# Patient Record
Sex: FEMALE | Race: WHITE | NOT HISPANIC OR LATINO | Employment: OTHER | URBAN - NONMETROPOLITAN AREA
[De-identification: names, ages, dates, MRNs, and addresses within clinical notes are randomized per-mention and may not be internally consistent; named-entity substitution may affect disease eponyms.]

---

## 2023-02-25 PROBLEM — M25.661: Status: ACTIVE | Noted: 2023-02-25

## 2023-02-25 PROBLEM — M62.81 MUSCLE WEAKNESS: Status: ACTIVE | Noted: 2023-02-25

## 2023-02-25 PROBLEM — Z87.42 HISTORY OF DYSPAREUNIA IN FEMALE: Status: ACTIVE | Noted: 2023-02-25

## 2023-02-25 PROBLEM — R07.9 CHEST PAIN: Status: ACTIVE | Noted: 2023-02-25

## 2023-02-25 PROBLEM — M25.561 KNEE PAIN, RIGHT: Status: ACTIVE | Noted: 2023-02-25

## 2023-02-25 PROBLEM — M62.838 MUSCLE SPASM: Status: ACTIVE | Noted: 2023-02-25

## 2023-02-25 PROBLEM — F54 PSYCHOLOGICAL FACTOR AFFECTING PHYSICAL CONDITION: Status: ACTIVE | Noted: 2023-02-25

## 2023-02-25 PROBLEM — N87.1 CIN II (CERVICAL INTRAEPITHELIAL NEOPLASIA II): Status: ACTIVE | Noted: 2023-02-25

## 2023-02-25 PROBLEM — N95.1 MENOPAUSAL SYMPTOMS: Status: ACTIVE | Noted: 2023-02-25

## 2023-02-25 RX ORDER — OMEPRAZOLE 40 MG/1
CAPSULE, DELAYED RELEASE ORAL
COMMUNITY
End: 2023-08-16 | Stop reason: WASHOUT

## 2023-02-25 RX ORDER — ESTRADIOL 4 UG/1
INSERT VAGINAL
COMMUNITY
End: 2023-12-20 | Stop reason: SDUPTHER

## 2023-02-25 RX ORDER — VALACYCLOVIR HYDROCHLORIDE 1 G/1
TABLET, FILM COATED ORAL
COMMUNITY
End: 2023-03-10 | Stop reason: ALTCHOICE

## 2023-02-25 RX ORDER — MULTIVITAMIN
TABLET ORAL
COMMUNITY

## 2023-03-10 ENCOUNTER — OFFICE VISIT (OUTPATIENT)
Dept: PRIMARY CARE | Facility: CLINIC | Age: 48
End: 2023-03-10
Payer: COMMERCIAL

## 2023-03-10 VITALS
RESPIRATION RATE: 12 BRPM | BODY MASS INDEX: 22.08 KG/M2 | OXYGEN SATURATION: 96 % | SYSTOLIC BLOOD PRESSURE: 125 MMHG | DIASTOLIC BLOOD PRESSURE: 85 MMHG | HEIGHT: 66 IN | HEART RATE: 77 BPM | WEIGHT: 137.4 LBS | TEMPERATURE: 98.1 F

## 2023-03-10 DIAGNOSIS — K59.00 CONSTIPATION, UNSPECIFIED CONSTIPATION TYPE: Primary | ICD-10-CM

## 2023-03-10 DIAGNOSIS — F54 PSYCHOLOGICAL FACTOR AFFECTING PHYSICAL CONDITION: ICD-10-CM

## 2023-03-10 DIAGNOSIS — Z00.00 HEALTHCARE MAINTENANCE: ICD-10-CM

## 2023-03-10 PROBLEM — M25.561 KNEE PAIN, RIGHT: Status: RESOLVED | Noted: 2023-02-25 | Resolved: 2023-03-10

## 2023-03-10 PROBLEM — N87.1 CIN II (CERVICAL INTRAEPITHELIAL NEOPLASIA II): Status: RESOLVED | Noted: 2023-02-25 | Resolved: 2023-03-10

## 2023-03-10 PROCEDURE — 99396 PREV VISIT EST AGE 40-64: CPT | Performed by: FAMILY MEDICINE

## 2023-03-10 PROCEDURE — 1036F TOBACCO NON-USER: CPT | Performed by: FAMILY MEDICINE

## 2023-03-10 PROCEDURE — 90471 IMMUNIZATION ADMIN: CPT | Performed by: FAMILY MEDICINE

## 2023-03-10 PROCEDURE — 90715 TDAP VACCINE 7 YRS/> IM: CPT | Performed by: FAMILY MEDICINE

## 2023-03-10 NOTE — ASSESSMENT & PLAN NOTE
Ongoing for several weeks. Suspected related to depressive and anxiety symptoms  Mix Benefiber with natural laxative such as apple juice or prune juice. Use 1 heaping tablespoon in water or juice per day.  You could also try using or adding psyllium husk fiber (such as Metamucil -- 1 heaping tablespoon in glass of water/juice)

## 2023-03-10 NOTE — ASSESSMENT & PLAN NOTE
Depressive and anxiety symptoms likely contributing to constipation.  Patient would not like to start medication at this time.  Planning on seeing a counselor through her 's employer.

## 2023-03-10 NOTE — PROGRESS NOTES
Subjective   Patient ID: Lien Lee is a 47 y.o. female who presents for annual physical/wellness visit.    She signed document informing that if problem issues also address at today's wellness visit that insurance may be appropriately billed so co-pay and deductible out of pocket expenses may occur.    Colorectal cancer screen: Cologuard- 05/13/2022   Mammogram: 08/04/2022  Pap: 09/13/2022  Last Menstrual Period: Hysto  Tdap: Due  Flu shot:12/14/2022  HIV screen:  Hepatitis C screen: 11/04/2020  Hepatitis B vaccine: Due     HPI     Has been having constipation recently,thinks do to stress. She is going every other day and when she goes at times she notices blood.    Patient c/o constipation. She reports having BM every other day. She does not have pain with BM but reports straining and discomfort. Ongoing for the past few weeks. Benefiber every morning. Eats a salad for lunch and has light dinner. Reports drinking milk frequently for calcium source. Patient also reports noticing red blood in her stool the morning after having colonic massage.    She believes constipation may be secondary to depression. 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 Patient reports having anxiety attack-like episode when she was line at the airport -- she felt a tightness in her chest and that she had to leave. She reports having bout of depression when her father passed away in the early 2000s. She was on Wellbutrin during that time. She notes that exercise helps with depression. She would not like to start medication at this time. She is considering starting with counseling.    Patient has been following with gynecology for vaginal atrophy and pain during intercourse. She was started estrogen vaginal suppositories 4 mcg since November. She also started. pelvic floor PT. She has not noticed much improvement. She states it is affecting her psychologically (associating pleasurable activity with  "pain), so she is following with psychologist.    Patient was seen by optometrist for dry eye. She was told that it was caused by chronic use of OTC eye drops. She was prescribed antimicrobial eye drop, eye salve at night, and eye drop. Started about 9 days ago and symptoms have started to improve.    Follows with dentist regularly. Endorses mild difficulty hearing others in a crowded room.    BLE swelling: It is ongoing which has not been improving or worsening. Ongoing for >6 months. She has been wearing compression stockings with little improvement. Generalized joint pain: She states she had x-rays of multiple joints which were negative. Autoimmune blood tests were negative. Patient has been following with urogynecologist for uterine prolapse. She reports ongoing constipation and urinary incontinence secondary to uterine prolapse.  She is planning to have a hysterectomy with possible bladder repair. She would strongly prefer to not have ovaries removed. Patient would be interested in having a second opinion about the need for bladder surgery. Patient takes levothyroxine daily. No side effects. Patient takes Nexium 40 mg daily. She reports it does not provide relief for postprandial abdominal bloating.     Review of Systems  constitutional: as per HPI  eyes:as per HPI  CV:as per HPI  Respiratory:as per HPI  GI:as per HPI  neuro:as per HPI  endo:as per HPI  heme/lymph:as per HPI     Objective   /85 (BP Location: Right arm, Patient Position: Sitting, BP Cuff Size: Adult)   Pulse 77   Temp 36.7 °C (98.1 °F) (Temporal)   Resp 12   Ht 1.676 m (5' 6\")   Wt 62.3 kg (137 lb 6.4 oz)   SpO2 96%   BMI 22.18 kg/m²     Physical Exam  Constitutional:       Appearance: Normal appearance. She is normal weight.   HENT:      Head: Normocephalic.      Right Ear: Tympanic membrane, ear canal and external ear normal.      Left Ear: Tympanic membrane, ear canal and external ear normal.   Eyes:      Conjunctiva/sclera: " Conjunctivae normal.      Pupils: Pupils are equal, round, and reactive to light.   Cardiovascular:      Rate and Rhythm: Normal rate and regular rhythm.   Pulmonary:      Effort: Pulmonary effort is normal.      Breath sounds: Normal breath sounds.   Abdominal:      General: Abdomen is flat. Bowel sounds are normal.      Palpations: Abdomen is soft.   Musculoskeletal:         General: Normal range of motion.   Skin:     General: Skin is warm.   Neurological:      Mental Status: She is alert and oriented to person, place, and time.   Psychiatric:         Mood and Affect: Mood normal.         Behavior: Behavior normal.         Thought Content: Thought content normal.         Judgment: Judgment normal.         Assessment/Plan   Problem List Items Addressed This Visit          Digestive    Constipation - Primary     Ongoing for several weeks. Suspected related to depressive and anxiety symptoms  Mix Benefiber with natural laxative such as apple juice or prune juice. Use 1 heaping tablespoon in water or juice per day.  You could also try using or adding psyllium husk fiber (such as Metamucil -- 1 heaping tablespoon in glass of water/juice)         Relevant Orders    TSH with reflex to Free T4 if abnormal       Other    Psychological factor affecting physical condition     Depressive and anxiety symptoms likely contributing to constipation.  Patient would not like to start medication at this time.  Planning on seeing a counselor through her 's employer.          Other Visit Diagnoses       Wellness examination        Healthcare maintenance        Relevant Orders    CBC and Auto Differential    Lipid Panel    Comprehensive Metabolic Panel              Hearing test:  Recommended to have hearing test to assess hearing loss.  Discussed that untreated hearing loss increases risk for dementia.  [facility] has an affordable audiology department to purchase hearing aids if needed.    Tips for your general  "wellness...;  Remember importance of daily aerobic exercise for 30minutes to help with both your physical and mental/emotional health.  ;  Take time twice a day to relax with focused breathing and relaxation.  A good source to learn \"mindfulness\" relaxation is a book \"Mindfulness for Beginners\" by Jose Clark.  ;    Strive for healthy eating with plenty of water, fruits, vegetables, and choosing as much plant based diet as able  If do consume non plant protein, choose fish high in omega (like salmon or cod), skinless poultry, and rarely anything from a cow  Avoid processed foods.  ;  Shop the perimeter of the grocery store  - not the inner aisles where all the boxed, bagged, and canned process non natural foods are   Avoid sugar - including fruit juices with added sugar and avoid artificial sweeteners (sucralose, aspartame).; if want to use sweetener, use stevia (natural plant based non caloric sweetener)  Recommended guided nutrition plans include Mediterranean Diet - online resources available     Get plenty of sleep nightly - 7 hours minimum;    Exercise 150min per week;    Do not use tobacco    Abstain or limit alcohol to 1-2 drinks per 24 hours    See your dentist at least yearly    Have an eye check at least every 5 years    Follow up 1 year for annual wellness checkup;      By signing my name below I, garrett Dorantes, attdanitza that this documentation has been prepared under the direction and in the presence of Dr. Caleb Hudson. 03/10/23   "

## 2023-06-07 ENCOUNTER — OFFICE VISIT (OUTPATIENT)
Dept: PRIMARY CARE | Facility: CLINIC | Age: 48
End: 2023-06-07
Payer: COMMERCIAL

## 2023-06-07 VITALS
BODY MASS INDEX: 21.45 KG/M2 | SYSTOLIC BLOOD PRESSURE: 145 MMHG | HEART RATE: 91 BPM | WEIGHT: 132.9 LBS | RESPIRATION RATE: 14 BRPM | DIASTOLIC BLOOD PRESSURE: 80 MMHG | OXYGEN SATURATION: 96 % | TEMPERATURE: 99.3 F

## 2023-06-07 DIAGNOSIS — R10.9 ABDOMINAL PAIN, UNSPECIFIED ABDOMINAL LOCATION: Primary | ICD-10-CM

## 2023-06-07 DIAGNOSIS — K59.00 CONSTIPATION, UNSPECIFIED CONSTIPATION TYPE: ICD-10-CM

## 2023-06-07 PROCEDURE — 1036F TOBACCO NON-USER: CPT | Performed by: FAMILY MEDICINE

## 2023-06-07 PROCEDURE — 99213 OFFICE O/P EST LOW 20 MIN: CPT | Performed by: FAMILY MEDICINE

## 2023-06-07 RX ORDER — VALACYCLOVIR HYDROCHLORIDE 1 G/1
TABLET, FILM COATED ORAL
COMMUNITY
Start: 2022-03-01

## 2023-06-07 ASSESSMENT — PATIENT HEALTH QUESTIONNAIRE - PHQ9
1. LITTLE INTEREST OR PLEASURE IN DOING THINGS: NOT AT ALL
SUM OF ALL RESPONSES TO PHQ9 QUESTIONS 1 AND 2: 0
2. FEELING DOWN, DEPRESSED OR HOPELESS: NOT AT ALL

## 2023-06-07 ASSESSMENT — PAIN SCALES - GENERAL: PAINLEVEL: 0-NO PAIN

## 2023-06-07 NOTE — PROGRESS NOTES
Subjective   Patient ID: Lien Lee is a 47 y.o. female who presents for ER Follow-up (ED visit on 06/03/2023 for severe lower abdominal/pelvic pain/Report in chart).      Dr. Hudson Patient    HPI     Patient here for follow-up of ER visit for abdominal/pelvic pain.  She was seen at [facility] on 6/3/2023  She had labs done, including CBC, CMP, UA, INR/PT, and lipase, patient reports these were normal.  She had CT abdomen/pelvis in ER and this was unremarkable per patient.    She reports hx of partial hysterectomy, she still has her ovaries.  She had LEEP that led to her hysterectomy due to scar tissue.  She also had surgery for the scar tissue after her hysterectomy.  She is presently on estrogen therapy, reports issues with atrophy, hot flashes, and sleep issues felt to be related to menopause.  No prior history of endometriosis to her knowledge.    Patient states symptoms started about 6 days ago with pelvic pain.  She subsequently developed some bloating.  These persisted for a few days, however, she was able to continue with her activities.  A few days later she went hiking and on the way back home she developed severe lower abdominal pain and rectal pressure that caused her to double over.  She endorses history of similar rectal pressure about 10 years ago.  Was told she develops scar tissue easily.    Patient states today is the first day she did not feel the lower abdominal pain/pressure.  She denies any fever during the course of this pain.    She reports ongoing issues with constipation.  Was previously having BM every day, now every other day.  Saw Dr. Hudson for this recently in the past  States was given recommendations but does not seem to be working for her.  No change in BM since this visit.  She has bene fiber with coffee every morning.  Smoothie every morning with spinach, bananas, berries.      Review of Systems   All other systems reviewed and are negative.      Objective   BP  145/80 (BP Location: Right arm, Patient Position: Sitting, BP Cuff Size: Adult)   Pulse 91   Temp 37.4 °C (99.3 °F)   Resp 14   Wt 60.3 kg (132 lb 14.4 oz)   SpO2 96%   BMI 21.45 kg/m²     Physical Exam  Vitals and nursing note reviewed.   Constitutional:       General: She is not in acute distress.     Appearance: Normal appearance. She is not toxic-appearing.   HENT:      Head: Normocephalic and atraumatic.   Cardiovascular:      Rate and Rhythm: Normal rate and regular rhythm.      Heart sounds: No murmur heard.     No friction rub. No gallop.   Pulmonary:      Effort: Pulmonary effort is normal.      Breath sounds: Normal breath sounds. No wheezing, rhonchi or rales.   Abdominal:      General: Bowel sounds are normal.      Palpations: Abdomen is soft. There is no mass.      Tenderness: There is abdominal tenderness (mildly, soft tissues slightly resistent to pressure LLQ vs RLQ) in the left lower quadrant. There is no guarding.   Musculoskeletal:      Right lower leg: No edema.      Left lower leg: No edema.   Neurological:      General: No focal deficit present.      Mental Status: She is alert and oriented to person, place, and time.   Psychiatric:         Mood and Affect: Mood normal.         Behavior: Behavior normal.       Assessment/Plan   Diagnoses and all orders for this visit:  Abdominal pain, unspecified abdominal location  Constipation, unspecified constipation type    ER records available were reviewed (complete records not available via EMR nor care everywhere). Only limited available at time of visit, will try to obtain full report.     Labs done and reviewed including CBC, CMP, UA, INR/PT, and lipase, these were normal.  She had CT abdomen/pelvis in ER and this was unremarkable per patient, report not available at time of visit today. Will try to obtain copy of this report.    Since abdominal pain is improved, patient with discomfort today/feeling well, and ER work-up unremarkable no further  work-up indicated at this time. Exam today was overall reassuring, no acute abdomen signs but there was some slight resistance to pressure of LLQ in comparison to the RLQ, we discussed this can be consistent with things such as scar tissues, adhesions. Patient may consider colonoscopy (screening vs diagnostic) if either the abdominal pain or ongoing constipation persists/worsens.    Patient reports constipation for months, predated above abdominal pain.  She saw Dr. Hudson in March 2023 for this, blood work ordered at that time including TSH but this has not been completed.  Recommended she add on Psyllium, working up to twice daily.    Follow-up with Dr. Hudson for routine care.  Call for sooner follow-up if needed.          Scribe Attestation  By signing my name below, IOneyda Scribe   attest that this documentation has been prepared under the direction and in the presence of Nneka Arnold DO.

## 2023-06-12 ENCOUNTER — TELEPHONE (OUTPATIENT)
Dept: PRIMARY CARE | Facility: CLINIC | Age: 48
End: 2023-06-12
Payer: COMMERCIAL

## 2023-06-12 NOTE — TELEPHONE ENCOUNTER
Called and spoke with patient. Patient was informed, understanding verbalized. She states that she wanted to let  you know shewas vomiting and dry heaving Saturday ( at least 12 times) along with the stomach pain from this weekend.  Had VV for dehydration through urgent care. Today she is having diarrhea, nausea and fatigue.

## 2023-06-12 NOTE — TELEPHONE ENCOUNTER
The patient is calling to obtain an update from Dr. Arnold about the CT that was done at [facility] about a week or so ago.  The patient states she had another bout of stomach pain this weekend. She thinks if may have been alcohol induced as she did have more to drink than she usually does.  The patient states she was told that she would look for the images and have a follow up call.

## 2023-06-13 DIAGNOSIS — R10.9 ABDOMINAL PAIN, UNSPECIFIED ABDOMINAL LOCATION: Primary | ICD-10-CM

## 2023-06-13 NOTE — TELEPHONE ENCOUNTER
The patient is calling back. Advised that the provider has not seen Letty's message from yesterday as of yet.  The patient wanted it stressed she does not think its alcohol induced at this time since its now Tuesday and she is still fatigued.  The patient would like to proceed with the GI referral and is asking if she could have an US ordered for her appendix?  Please advise

## 2023-06-19 ENCOUNTER — TELEPHONE (OUTPATIENT)
Dept: PRIMARY CARE | Facility: CLINIC | Age: 48
End: 2023-06-19
Payer: COMMERCIAL

## 2023-06-19 NOTE — TELEPHONE ENCOUNTER
"The patient called in and explained everything this morning that was sent to the provider in the email.   The patient is active in [employer] and was attempting to us [facility].  She was given the IT phone number for [employer] to reset her settings.  The patient was then transferred to [facility] to assist with GI appointment as she has not scheduled because she stated \" I started to feel better, so I didn't make an appointment. I had two episodes yesterday though, so I would like to make an appointment now I guess.\"  Again, patient was transferred to schedule with GI.  "

## 2023-06-19 NOTE — TELEPHONE ENCOUNTER
Patient sent me this message via email  (See below copy/pasted from email)    -she indicates not able to see things or message in portal - [employer] shows she has not logged on since march so likely she is using old follow my health - confirm [employer] access    - recommendation is same as Dr RAM - she should see GI doctor for further evaluation since labs and CT not explain her symptoms  Appears this was prior initiated so make sure this referral/appointment is made    Email:  Good morning, Dr. Hudson.    My apologies for the private email, but I don't appear to have access to message you via the portal.    I wanted to bring you up to speed on a health condition. On June 3rd, I visited the [facility] ER for lower abdominal pains. I had these awful cramps or muscle spasms that radiated around my lower abdomen near my pelvic area causing rectal spasms as well. It almost felt like menstrual cramps, but I had a hysterectomy.    They ran bloodwork, took urine samples and performed a CT scan finding nothing remarkable.    I visited your office on June 7th and visited Dr. Nneka Arriaga. For some reason, the notes are not in my  portal, but I was there. She didn't seem to find anything remarkable and said she'd get her hands on the scan to review.     On Saturday, June 10th, I was vomiting all day. I had a bit too much to drink with friends the night before, but it was the worst I'd ever experienced. I don't overindulge much - this was a rare occasion. I was vomiting and the heaving for approximately 12 hours. I scheduled an urgent care virtual visit and was told that if I couldn't keep anything down by Sunday of if my heart rate was high and blood pressure low, to go to the ER. Thankfully, that wasn't necessary. I was exhausted until Thursday of this past week. Fatigued w/ awful acid reflux (I filled the prescription you gave me)and just recovering from the illness.    I followed up last week to let her know about  the illness and to see if she had reviewed the scan. I was told she had and she found a small stone in my appendix; stating that these are typically asymptomatic. I was offered a referral to a GI doc and when they called, I told them I'd call back if need be, because come Friday, I felt I had a handle on things.    Then yesterday, the muscle spasms occurred again yesterday. They were just as painful and it happened twice. The second time, my  ran to the store and got me some Gas X - I took it and it helped in minutes.    I'm not sure what to do here. I discussed a colonoscopy with Dr. Arriaga and she didn't seem to think it was necessary, but I'm very concerned.    I'm looking for guidance and hope you can help. My stomach is out of wack, I have this awful reflux and I pray I don't get those spasms again, but there's got to be a reason this is happening.    Thank you for your time with this. I hope we can determine a course of action very soon.

## 2023-07-07 ENCOUNTER — APPOINTMENT (OUTPATIENT)
Dept: PRIMARY CARE | Facility: CLINIC | Age: 48
End: 2023-07-07
Payer: COMMERCIAL

## 2023-07-07 ENCOUNTER — OFFICE VISIT (OUTPATIENT)
Dept: PRIMARY CARE | Facility: CLINIC | Age: 48
End: 2023-07-07
Payer: COMMERCIAL

## 2023-07-07 VITALS
DIASTOLIC BLOOD PRESSURE: 77 MMHG | WEIGHT: 134.8 LBS | RESPIRATION RATE: 12 BRPM | OXYGEN SATURATION: 96 % | BODY MASS INDEX: 21.76 KG/M2 | HEART RATE: 80 BPM | SYSTOLIC BLOOD PRESSURE: 118 MMHG | TEMPERATURE: 99 F

## 2023-07-07 DIAGNOSIS — B37.31 VAGINAL CANDIDA: ICD-10-CM
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
## 2023-07-07 PROBLEM — R07.9 CHEST PAIN: Status: RESOLVED | Noted: 2023-02-25 | Resolved: 2023-07-07

## 2023-07-07 PROCEDURE — 99213 OFFICE O/P EST LOW 20 MIN: CPT | Performed by: FAMILY MEDICINE

## 2023-07-07 PROCEDURE — 1036F TOBACCO NON-USER: CPT | Performed by: FAMILY MEDICINE

## 2023-07-07 RX ORDER — FLUCONAZOLE 150 MG/1
150 TABLET ORAL ONCE
Qty: 1 TABLET | Refills: 0 | Status: SHIPPED | OUTPATIENT
Start: 2023-07-07 | End: 2023-07-07

## 2023-07-07 RX ORDER — BETAMETHASONE DIPROPIONATE 0.5 MG/G
CREAM TOPICAL 2 TIMES DAILY
Qty: 15 G | Refills: 0 | Status: SHIPPED | OUTPATIENT
Start: 2023-07-07 | End: 2023-08-16 | Stop reason: WASHOUT

## 2023-07-07 RX ORDER — DOXYCYCLINE 100 MG/1
100 CAPSULE ORAL 2 TIMES DAILY
Qty: 14 CAPSULE | Refills: 0 | Status: SHIPPED | OUTPATIENT
Start: 2023-07-07 | End: 2023-07-14

## 2023-07-07 NOTE — ASSESSMENT & PLAN NOTE
Suspect possible infection based on physical exam.  Prescribed cortisone cream and 7 day course of doxycycline BID.   Call in if symptoms have not cleared in 1 week.

## 2023-07-07 NOTE — PROGRESS NOTES
Subjective   Patient ID: Lien Lee is a 47 y.o. female who presents for Insect Bite.    Bite hussein is on the left side lower on the rib cage.  Red hussein all away around the area,   Bite hussein is a dark color.   Itchy and painful to the area.     Not sure how she got it, woke up with it,.       HPI   Patient presents today with suspected bug bite located on left lower rib cage. She noticed it after she woke up 2 days ago when looking in the mirror. The area is red and tender.  She states she went for a hike this past weekend. She did not feel a bug bite or sting.     Review of Systems  constitutional: as per HPI  eyes:as per HPI  CV:as per HPI  Respiratory:as per HPI  GI:as per HPI  neuro:as per HPI  endo:as per HPI  heme/lymph:as per HPI     Objective   /77 (BP Location: Left arm, Patient Position: Sitting, BP Cuff Size: Adult)   Pulse 80   Temp 37.2 °C (99 °F) (Temporal)   Resp 12   Wt 61.1 kg (134 lb 12.8 oz)   SpO2 96%   BMI 21.76 kg/m²     Physical Exam  Constitutional:       Appearance: Normal appearance.   Cardiovascular:      Rate and Rhythm: Normal rate and regular rhythm.   Pulmonary:      Effort: Pulmonary effort is normal.      Breath sounds: Normal breath sounds.   Skin:     Comments: Red punctate lesion on left lower rib cage. No purulence. Mildly tender.         Assessment/Plan   Problem List Items Addressed This Visit          Musculoskeletal and Injuries    Bite, insect - Primary     Suspect possible infection based on physical exam.  Prescribed cortisone cream and 7 day course of doxycycline BID.   Call in if symptoms have not cleared in 1 week.         Relevant Medications    doxycycline (Vibramycin) 100 mg capsule    betamethasone dipropionate 0.05 % cream     Other Visit Diagnoses       Vaginal candida        Prescribed Diflucan to prevent yeast infection.    Relevant Medications    fluconazole (Diflucan) 150 mg tablet               By signing my name below Lewis HERMOSILLO  scribe, attest that this documentation has been prepared under the direction and in the presence of Dr. Caleb Hudson. 07/07/23

## 2023-07-12 ENCOUNTER — TELEPHONE (OUTPATIENT)
Dept: PRIMARY CARE | Facility: CLINIC | Age: 48
End: 2023-07-12
Payer: COMMERCIAL

## 2023-07-12 DIAGNOSIS — Z22.322 MRSA COLONIZATION: Primary | ICD-10-CM

## 2023-07-12 RX ORDER — MUPIROCIN 20 MG/G
OINTMENT TOPICAL
Qty: 15 G | Refills: 0 | Status: SHIPPED | OUTPATIENT
Start: 2023-07-12 | End: 2023-08-16 | Stop reason: WASHOUT

## 2023-07-12 NOTE — TELEPHONE ENCOUNTER
The patient states she woke this morning with another bite. There is irritation again on her hand. She is taking the Doxy prescribed for the last one, she is putting a topical on it as well. She has all her laundry in the wash as a precautionary.  She does not believe to have bed bugs, its not a tick bite, and she does not know what is going on.  The patient is asking the provider what she should do?

## 2023-07-12 NOTE — TELEPHONE ENCOUNTER
When we see more than 1 'bite' - then need to consider more likely these are not actual insect/bug bites but instead infection within the skin causing outbreaks - common is staph infection    I want her to continue the doxy  Wash the area that look like bites with soapy water twice daily and keep clean and dry  I also want to have her start an antibiotic ointment to apply inside both nostrils /nose three times daily for a week - it is common for staph to be active inside nose as source of the skin infection    If this does not clear, call as next step would be to have her see dermatologist

## 2023-08-11 ENCOUNTER — LAB (OUTPATIENT)
Dept: LAB | Facility: LAB | Age: 48
End: 2023-08-11
Payer: COMMERCIAL

## 2023-08-11 DIAGNOSIS — Z00.00 HEALTHCARE MAINTENANCE: ICD-10-CM

## 2023-08-11 DIAGNOSIS — K59.00 CONSTIPATION, UNSPECIFIED CONSTIPATION TYPE: ICD-10-CM

## 2023-08-11 LAB
ALANINE AMINOTRANSFERASE (SGPT) (U/L) IN SER/PLAS: 20 U/L (ref 7–45)
ALBUMIN (G/DL) IN SER/PLAS: 4.5 G/DL (ref 3.4–5)
ALKALINE PHOSPHATASE (U/L) IN SER/PLAS: 59 U/L (ref 33–110)
ANION GAP IN SER/PLAS: 13 MMOL/L (ref 10–20)
ASPARTATE AMINOTRANSFERASE (SGOT) (U/L) IN SER/PLAS: 21 U/L (ref 9–39)
BASOPHILS (10*3/UL) IN BLOOD BY AUTOMATED COUNT: 0.08 X10E9/L (ref 0–0.1)
BASOPHILS/100 LEUKOCYTES IN BLOOD BY AUTOMATED COUNT: 1.4 % (ref 0–2)
BILIRUBIN TOTAL (MG/DL) IN SER/PLAS: 0.8 MG/DL (ref 0–1.2)
CALCIUM (MG/DL) IN SER/PLAS: 9.6 MG/DL (ref 8.6–10.6)
CARBON DIOXIDE, TOTAL (MMOL/L) IN SER/PLAS: 28 MMOL/L (ref 21–32)
CHLORIDE (MMOL/L) IN SER/PLAS: 103 MMOL/L (ref 98–107)
CHOLESTEROL (MG/DL) IN SER/PLAS: 188 MG/DL (ref 0–199)
CHOLESTEROL IN HDL (MG/DL) IN SER/PLAS: 70.3 MG/DL
CHOLESTEROL/HDL RATIO: 2.7
CREATININE (MG/DL) IN SER/PLAS: 0.75 MG/DL (ref 0.5–1.05)
EOSINOPHILS (10*3/UL) IN BLOOD BY AUTOMATED COUNT: 0.35 X10E9/L (ref 0–0.7)
EOSINOPHILS/100 LEUKOCYTES IN BLOOD BY AUTOMATED COUNT: 6.1 % (ref 0–6)
ERYTHROCYTE DISTRIBUTION WIDTH (RATIO) BY AUTOMATED COUNT: 12.5 % (ref 11.5–14.5)
ERYTHROCYTE MEAN CORPUSCULAR HEMOGLOBIN CONCENTRATION (G/DL) BY AUTOMATED: 32.9 G/DL (ref 32–36)
ERYTHROCYTE MEAN CORPUSCULAR VOLUME (FL) BY AUTOMATED COUNT: 92 FL (ref 80–100)
ERYTHROCYTES (10*6/UL) IN BLOOD BY AUTOMATED COUNT: 4.38 X10E12/L (ref 4–5.2)
GFR FEMALE: >90 ML/MIN/1.73M2
GLUCOSE (MG/DL) IN SER/PLAS: 87 MG/DL (ref 74–99)
HEMATOCRIT (%) IN BLOOD BY AUTOMATED COUNT: 40.1 % (ref 36–46)
HEMOGLOBIN (G/DL) IN BLOOD: 13.2 G/DL (ref 12–16)
IMMATURE GRANULOCYTES/100 LEUKOCYTES IN BLOOD BY AUTOMATED COUNT: 0.3 % (ref 0–0.9)
LDL: 102 MG/DL (ref 0–99)
LEUKOCYTES (10*3/UL) IN BLOOD BY AUTOMATED COUNT: 5.8 X10E9/L (ref 4.4–11.3)
LYMPHOCYTES (10*3/UL) IN BLOOD BY AUTOMATED COUNT: 1.89 X10E9/L (ref 1.2–4.8)
LYMPHOCYTES/100 LEUKOCYTES IN BLOOD BY AUTOMATED COUNT: 32.8 % (ref 13–44)
MONOCYTES (10*3/UL) IN BLOOD BY AUTOMATED COUNT: 0.45 X10E9/L (ref 0.1–1)
MONOCYTES/100 LEUKOCYTES IN BLOOD BY AUTOMATED COUNT: 7.8 % (ref 2–10)
NEUTROPHILS (10*3/UL) IN BLOOD BY AUTOMATED COUNT: 2.98 X10E9/L (ref 1.2–7.7)
NEUTROPHILS/100 LEUKOCYTES IN BLOOD BY AUTOMATED COUNT: 51.6 % (ref 40–80)
NRBC (PER 100 WBCS) BY AUTOMATED COUNT: 0 /100 WBC (ref 0–0)
PLATELETS (10*3/UL) IN BLOOD AUTOMATED COUNT: 231 X10E9/L (ref 150–450)
POTASSIUM (MMOL/L) IN SER/PLAS: 4.1 MMOL/L (ref 3.5–5.3)
PROTEIN TOTAL: 7.1 G/DL (ref 6.4–8.2)
SODIUM (MMOL/L) IN SER/PLAS: 140 MMOL/L (ref 136–145)
THYROTROPIN (MIU/L) IN SER/PLAS BY DETECTION LIMIT <= 0.05 MIU/L: 1.85 MIU/L (ref 0.44–3.98)
TRIGLYCERIDE (MG/DL) IN SER/PLAS: 78 MG/DL (ref 0–149)
UREA NITROGEN (MG/DL) IN SER/PLAS: 11 MG/DL (ref 6–23)
VLDL: 16 MG/DL (ref 0–40)

## 2023-08-11 PROCEDURE — 36415 COLL VENOUS BLD VENIPUNCTURE: CPT

## 2023-08-11 PROCEDURE — 85025 COMPLETE CBC W/AUTO DIFF WBC: CPT

## 2023-08-11 PROCEDURE — 80053 COMPREHEN METABOLIC PANEL: CPT

## 2023-08-11 PROCEDURE — 80061 LIPID PANEL: CPT

## 2023-08-11 PROCEDURE — 84443 ASSAY THYROID STIM HORMONE: CPT

## 2023-08-14 NOTE — PROGRESS NOTES
Subjective        Lien Lee is a 47 y.o. female who presents for      HPI:    [facility] pt      Chief Complaint   Patient presents with    Hypertension    Gout         When was pt diagnosed with gout? Never just has concerns       What concern/ problem/pain/symptom  brings you here today? Elevated bp, gout     Anxious over BP's       Per pt she had discussed this with Dr Hudson   Discussed medications- wants to start         how long has pt had sxs?  X7 days       describe symptoms-   Gout felt like broken toe - right big toe- symptoms resolved at this time   Resolved on its own  Pt avoided alcohol    Pt doing scales   No SI    Pt seeing GYN for menopause symptoms     has pt tried anything for current symptoms, including medications (OTC or prescription)  ?    No    what makes symptoms worse?  Alcohol      has pt been seen recently for this problem ( within past 2-3 weeks) ? No       if yes- where?    by who?    what treatment was provided?    Pt brings home BP's - 120-150's/'s - reviewed - cuff not validated at this time         8/11/23- labs done per [facility]        Social History     Tobacco Use   Smoking Status Never    Passive exposure: Past   Smokeless Tobacco Never         Review of Systems:        Objective        Vitals:    08/16/23 1441   BP: 152/84   BP Location: Left arm   Patient Position: Sitting   BP Cuff Size: Adult   Pulse: 82   Resp: 16   Temp: 36.7 °C (98.1 °F)   TempSrc: Temporal   SpO2: 98%   Weight: 62.1 kg (137 lb)         Patient is alert and oriented x 3 , NAD  HEAD- normocephalic and atraumatic   EYES-conjunctiva-normal, lids - normal  EARS/NOSE- normal external exam   NECK-supple,FROM  CV- RRR without murmur  PULM- CTA bilaterally, normal respiratory effort  RESPIRATORY EFFORT- normal , no retractions or nasal flaring   ABD- normoactive BS's   EXT- no edema,NT  SKIN- no abnormal skin lesions noted  NEURO- no focal deficits  PSYCH- pleasant, normal judgement and  insight                BP Readings from Last 3 Encounters:   08/16/23 152/84   07/07/23 118/77   06/07/23 145/80           Wt Readings from Last 3 Encounters:   08/16/23 62.1 kg (137 lb)   07/07/23 61.1 kg (134 lb 12.8 oz)   06/07/23 60.3 kg (132 lb 14.4 oz)         BMI Readings from Last 3 Encounters:   08/16/23 22.11 kg/m²   07/07/23 21.76 kg/m²   06/07/23 21.45 kg/m²     Has cankre sore- renato medication     Assessment/Plan      1. Elevated BP without diagnosis of hypertension  Follow Up In [facility] - PCP - Established    escitalopram (Lexapro) 10 mg tablet        Start Lexapro     Avoid salt and caffeine (drinks 2 cups of coffee)       Check BP weekly and record      Follow up with PCP- [facility]- for follow up - BP - consider medications and PE

## 2023-08-16 ENCOUNTER — OFFICE VISIT (OUTPATIENT)
Dept: PRIMARY CARE | Facility: CLINIC | Age: 48
End: 2023-08-16
Payer: COMMERCIAL

## 2023-08-16 VITALS
RESPIRATION RATE: 16 BRPM | SYSTOLIC BLOOD PRESSURE: 152 MMHG | WEIGHT: 137 LBS | HEART RATE: 82 BPM | DIASTOLIC BLOOD PRESSURE: 84 MMHG | OXYGEN SATURATION: 98 % | TEMPERATURE: 98.1 F | BODY MASS INDEX: 22.11 KG/M2

## 2023-08-16 DIAGNOSIS — R03.0 ELEVATED BP WITHOUT DIAGNOSIS OF HYPERTENSION: Primary | ICD-10-CM

## 2023-08-16 PROCEDURE — 99214 OFFICE O/P EST MOD 30 MIN: CPT | Performed by: FAMILY MEDICINE

## 2023-08-16 PROCEDURE — 1036F TOBACCO NON-USER: CPT | Performed by: FAMILY MEDICINE

## 2023-08-16 RX ORDER — CLINDAMYCIN PHOSPHATE 10 UG/ML
LOTION TOPICAL
COMMUNITY
Start: 2023-07-13

## 2023-08-16 RX ORDER — ESCITALOPRAM OXALATE 10 MG/1
10 TABLET ORAL DAILY
Qty: 30 TABLET | Refills: 5 | Status: SHIPPED | OUTPATIENT
Start: 2023-08-16 | End: 2023-12-20 | Stop reason: SDUPTHER

## 2023-08-16 ASSESSMENT — ANXIETY QUESTIONNAIRES
GAD7 TOTAL SCORE: 12
2. NOT BEING ABLE TO STOP OR CONTROL WORRYING: MORE THAN HALF THE DAYS
4. TROUBLE RELAXING: MORE THAN HALF THE DAYS
7. FEELING AFRAID AS IF SOMETHING AWFUL MIGHT HAPPEN: NOT AT ALL
3. WORRYING TOO MUCH ABOUT DIFFERENT THINGS: MORE THAN HALF THE DAYS
IF YOU CHECKED OFF ANY PROBLEMS ON THIS QUESTIONNAIRE, HOW DIFFICULT HAVE THESE PROBLEMS MADE IT FOR YOU TO DO YOUR WORK, TAKE CARE OF THINGS AT HOME, OR GET ALONG WITH OTHER PEOPLE: SOMEWHAT DIFFICULT
6. BECOMING EASILY ANNOYED OR IRRITABLE: SEVERAL DAYS
5. BEING SO RESTLESS THAT IT IS HARD TO SIT STILL: MORE THAN HALF THE DAYS
1. FEELING NERVOUS, ANXIOUS, OR ON EDGE: NEARLY EVERY DAY

## 2023-08-16 ASSESSMENT — PATIENT HEALTH QUESTIONNAIRE - PHQ9
3. TROUBLE FALLING OR STAYING ASLEEP OR SLEEPING TOO MUCH: MORE THAN HALF THE DAYS
8. MOVING OR SPEAKING SO SLOWLY THAT OTHER PEOPLE COULD HAVE NOTICED. OR THE OPPOSITE, BEING SO FIGETY OR RESTLESS THAT YOU HAVE BEEN MOVING AROUND A LOT MORE THAN USUAL: MORE THAN HALF THE DAYS
5. POOR APPETITE OR OVEREATING: MORE THAN HALF THE DAYS
6. FEELING BAD ABOUT YOURSELF - OR THAT YOU ARE A FAILURE OR HAVE LET YOURSELF OR YOUR FAMILY DOWN: SEVERAL DAYS
SUM OF ALL RESPONSES TO PHQ QUESTIONS 1-9: 13
SUM OF ALL RESPONSES TO PHQ9 QUESTIONS 1 AND 2: 2
9. THOUGHTS THAT YOU WOULD BE BETTER OFF DEAD, OR OF HURTING YOURSELF: NOT AT ALL
4. FEELING TIRED OR HAVING LITTLE ENERGY: MORE THAN HALF THE DAYS
1. LITTLE INTEREST OR PLEASURE IN DOING THINGS: SEVERAL DAYS
2. FEELING DOWN, DEPRESSED OR HOPELESS: SEVERAL DAYS
10. IF YOU CHECKED OFF ANY PROBLEMS, HOW DIFFICULT HAVE THESE PROBLEMS MADE IT FOR YOU TO DO YOUR WORK, TAKE CARE OF THINGS AT HOME, OR GET ALONG WITH OTHER PEOPLE: NOT DIFFICULT AT ALL
7. TROUBLE CONCENTRATING ON THINGS, SUCH AS READING THE NEWSPAPER OR WATCHING TELEVISION: MORE THAN HALF THE DAYS

## 2023-09-27 ENCOUNTER — OFFICE VISIT (OUTPATIENT)
Dept: PRIMARY CARE | Facility: CLINIC | Age: 48
End: 2023-09-27
Payer: COMMERCIAL

## 2023-09-27 VITALS
BODY MASS INDEX: 22.18 KG/M2 | DIASTOLIC BLOOD PRESSURE: 72 MMHG | SYSTOLIC BLOOD PRESSURE: 126 MMHG | WEIGHT: 137.4 LBS | HEART RATE: 75 BPM | OXYGEN SATURATION: 98 % | RESPIRATION RATE: 12 BRPM | TEMPERATURE: 97.8 F

## 2023-09-27 DIAGNOSIS — R03.0 ELEVATED BP WITHOUT DIAGNOSIS OF HYPERTENSION: ICD-10-CM

## 2023-09-27 DIAGNOSIS — F54 PSYCHOLOGICAL FACTOR AFFECTING PHYSICAL CONDITION: Primary | ICD-10-CM

## 2023-09-27 DIAGNOSIS — F41.9 ANXIETY: ICD-10-CM

## 2023-09-27 PROBLEM — M62.838 MUSCLE SPASM: Status: RESOLVED | Noted: 2023-02-25 | Resolved: 2023-09-27

## 2023-09-27 PROBLEM — M25.661: Status: RESOLVED | Noted: 2023-02-25 | Resolved: 2023-09-27

## 2023-09-27 PROBLEM — M62.81 MUSCLE WEAKNESS: Status: RESOLVED | Noted: 2023-02-25 | Resolved: 2023-09-27
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
## 2023-09-27 PROCEDURE — 99214 OFFICE O/P EST MOD 30 MIN: CPT | Performed by: FAMILY MEDICINE

## 2023-09-27 PROCEDURE — 90686 IIV4 VACC NO PRSV 0.5 ML IM: CPT | Performed by: FAMILY MEDICINE

## 2023-09-27 PROCEDURE — 1036F TOBACCO NON-USER: CPT | Performed by: FAMILY MEDICINE

## 2023-09-27 PROCEDURE — 90471 IMMUNIZATION ADMIN: CPT | Performed by: FAMILY MEDICINE

## 2023-09-27 NOTE — PROGRESS NOTES
Subjective   Patient ID: Lien Lee is a 48 y.o. female who presents for Hypertension and Depression.    Tdap: 03/10/2023  Mammogram:  08/04/2023  Would like to discuss getting a flu shot- currently not feeling well.  Currently having drainage, coughing, congestion, started with a sore throat while in [place], had the achy ness, chills and fever.  Every one she was with have this. Tested Negative for COVID on Monday.    Patient has brought in BP machine for validation.  Arm tested:   Right arm   Office Cuff size: Adult   Step 1:  A Patient machine: BP: 137/96 P: 78  B Patient machine: BP: 139/90 P: 76  C Office machine:   BP: 122/80 P: 73  D Patient machine: BP:  125/82 P: 75  E Office machine:   BP: 126/72   P: 75     Step 2-average of B & D= 132  Difference between Step 2 average and C reading=  If the difference is: 10   Less than 5mm Hg, machine is validated  Between 6 and 9 Hg, proceed to step 3  Greater than 10mm Hg-replace device and return to office for validation    Lexapro worked right away for her.   Next day she did feel like a zombie.         HPI     Review of Systems    Objective   /72 (BP Location: Right arm, Patient Position: Sitting, BP Cuff Size: Adult)   Pulse 75   Temp 36.6 °C (97.8 °F) (Temporal)   Resp 12   Wt 62.3 kg (137 lb 6.4 oz)   SpO2 98%   BMI 21.54 kg/m²     Physical Exam  Constitutional:       Appearance: Normal appearance.   Cardiovascular:      Rate and Rhythm: Normal rate.      Heart sounds: Normal heart sounds.   Pulmonary:      Breath sounds: Normal breath sounds.   Neurological:      General: No focal deficit present.      Mental Status: She is alert.   Psychiatric:         Mood and Affect: Mood normal.         Behavior: Behavior normal.         Judgment: Judgment normal.         Assessment/Plan

## 2023-10-01 PROBLEM — R03.0 ELEVATED BP WITHOUT DIAGNOSIS OF HYPERTENSION: Status: ACTIVE | Noted: 2023-10-01

## 2023-10-03 ENCOUNTER — OFFICE VISIT (OUTPATIENT)
Dept: OBSTETRICS AND GYNECOLOGY | Facility: CLINIC | Age: 48
End: 2023-10-03
Payer: COMMERCIAL

## 2023-10-03 VITALS — SYSTOLIC BLOOD PRESSURE: 116 MMHG | WEIGHT: 137.4 LBS | BODY MASS INDEX: 21.54 KG/M2 | DIASTOLIC BLOOD PRESSURE: 78 MMHG

## 2023-10-03 DIAGNOSIS — N95.2 ATROPHIC VAGINITIS: Primary | ICD-10-CM

## 2023-10-03 DIAGNOSIS — Z12.31 ENCOUNTER FOR SCREENING MAMMOGRAM FOR BREAST CANCER: ICD-10-CM

## 2023-10-03 DIAGNOSIS — Z01.419 WELL WOMAN EXAM: ICD-10-CM

## 2023-10-03 PROCEDURE — 1036F TOBACCO NON-USER: CPT | Performed by: OBSTETRICS & GYNECOLOGY

## 2023-10-03 PROCEDURE — 99396 PREV VISIT EST AGE 40-64: CPT | Performed by: OBSTETRICS & GYNECOLOGY

## 2023-10-03 RX ORDER — ESTRADIOL 4 UG/1
4 INSERT VAGINAL 2 TIMES WEEKLY
Qty: 24 EACH | Refills: 3 | Status: SHIPPED | OUTPATIENT
Start: 2023-10-05 | End: 2023-11-24 | Stop reason: SDUPTHER

## 2023-10-03 NOTE — PROGRESS NOTES
Subjective   Lien Lee is a 48 y.o. female who is here for a routine exam. Periods are  none hysterectomy , lasting     days. Dysmenorrhea:   . Cyclic symptoms include    . No intermenstrual bleeding, spotting, or discharge.    Current contraception: status post hysterectomy  History of abnormal Pap smear: yes - RAYMUNDO II s/p LEEP  Family history of uterine or ovarian cancer: no  Regular self breast exam: no  History of abnormal mammogram: no  Family history of breast cancer: no  History of abnormal lipids: no  Menstrual History:  OB History    No obstetric history on file.        Menarche age:   No LMP recorded. Patient has had a hysterectomy.         Review of Systems    Objective   /78   Wt 62.3 kg (137 lb 6.4 oz)   BMI 21.54 kg/m²     General:   alert, appears stated age, and cooperative   Heart:    Lungs:    Abdomen: soft, non-tender, without masses or organomegaly   Vulva: normal   Vagina: normal mucosa   Cervix: Surgically absent   Uterus: Surgically absent   Adnexa: no mass, fullness, tenderness     Assessment/Plan   Annual  Pap no longer indicated  Mammogram  RF imvexxy  Follow up one year

## 2023-10-03 NOTE — PROGRESS NOTES
Subjective   Patient ID: Lien Lee is a 48 y.o. female who presents for No chief complaint on file..  HPI    Review of Systems    Objective   Physical Exam    Assessment/Plan

## 2023-10-16 ENCOUNTER — APPOINTMENT (OUTPATIENT)
Dept: RADIOLOGY | Facility: CLINIC | Age: 48
End: 2023-10-16
Payer: COMMERCIAL

## 2023-10-19 ENCOUNTER — APPOINTMENT (OUTPATIENT)
Dept: RADIOLOGY | Facility: CLINIC | Age: 48
End: 2023-10-19
Payer: COMMERCIAL

## 2023-11-01 ENCOUNTER — APPOINTMENT (OUTPATIENT)
Dept: RADIOLOGY | Facility: CLINIC | Age: 48
End: 2023-11-01
Payer: COMMERCIAL

## 2023-11-17 ENCOUNTER — ANCILLARY PROCEDURE (OUTPATIENT)
Dept: RADIOLOGY | Facility: CLINIC | Age: 48
End: 2023-11-17
Payer: COMMERCIAL

## 2023-11-17 DIAGNOSIS — Z12.31 ENCOUNTER FOR SCREENING MAMMOGRAM FOR BREAST CANCER: ICD-10-CM

## 2023-11-17 PROCEDURE — 77067 SCR MAMMO BI INCL CAD: CPT

## 2023-11-17 PROCEDURE — 77063 BREAST TOMOSYNTHESIS BI: CPT | Mod: BILATERAL PROCEDURE | Performed by: RADIOLOGY

## 2023-11-17 PROCEDURE — 77067 SCR MAMMO BI INCL CAD: CPT | Mod: BILATERAL PROCEDURE | Performed by: RADIOLOGY

## 2023-11-21 ENCOUNTER — TELEPHONE (OUTPATIENT)
Dept: OBSTETRICS AND GYNECOLOGY | Facility: CLINIC | Age: 48
End: 2023-11-21
Payer: COMMERCIAL

## 2023-11-21 NOTE — TELEPHONE ENCOUNTER
Patient states she is on 4mcg of Imvexy.  Was getting from [facility] for $45 and now [facility] does not offer Imvexy, her local pharmacy states it will cost over $200.  Would like to know if there is a different pharmacy that provides the same discounted price as [facility] or if there are coupons she could use to lower the trejo?    Lien Hartman MA

## 2023-11-22 ENCOUNTER — HOSPITAL ENCOUNTER (OUTPATIENT)
Dept: RADIOLOGY | Facility: EXTERNAL LOCATION | Age: 48
Discharge: HOME | End: 2023-11-22

## 2023-11-22 RX ORDER — ESTRADIOL 4 UG/1
INSERT VAGINAL
Status: CANCELLED | OUTPATIENT
Start: 2023-11-22

## 2023-11-22 NOTE — TELEPHONE ENCOUNTER
Patient states after further review on her end all Imvexy Rx's were switched to a pharmacy called [facility].  However, her script was lost in transition.  Please send to the [facility] ASAP as patient is taking last pill today.    Thank you,    Lien Hartman MA

## 2023-11-24 DIAGNOSIS — N95.2 ATROPHIC VAGINITIS: ICD-10-CM

## 2023-11-24 RX ORDER — ESTRADIOL 4 UG/1
4 INSERT VAGINAL 2 TIMES WEEKLY
Qty: 24 EACH | Refills: 3 | Status: SHIPPED | OUTPATIENT
Start: 2023-11-27

## 2023-12-20 ENCOUNTER — OFFICE VISIT (OUTPATIENT)
Dept: PRIMARY CARE | Facility: CLINIC | Age: 48
End: 2023-12-20
Payer: COMMERCIAL

## 2023-12-20 VITALS
RESPIRATION RATE: 14 BRPM | TEMPERATURE: 98.6 F | BODY MASS INDEX: 22.09 KG/M2 | SYSTOLIC BLOOD PRESSURE: 135 MMHG | WEIGHT: 140.9 LBS | OXYGEN SATURATION: 99 % | DIASTOLIC BLOOD PRESSURE: 84 MMHG | HEART RATE: 73 BPM

## 2023-12-20 DIAGNOSIS — R03.0 ELEVATED BP WITHOUT DIAGNOSIS OF HYPERTENSION: ICD-10-CM

## 2023-12-20 DIAGNOSIS — F41.9 ANXIETY: Primary | ICD-10-CM

## 2023-12-20 PROCEDURE — 1036F TOBACCO NON-USER: CPT | Performed by: FAMILY MEDICINE

## 2023-12-20 PROCEDURE — 99214 OFFICE O/P EST MOD 30 MIN: CPT | Performed by: FAMILY MEDICINE

## 2023-12-20 RX ORDER — ESCITALOPRAM OXALATE 10 MG/1
15 TABLET ORAL DAILY
Qty: 45 TABLET | Refills: 2 | Status: SHIPPED | OUTPATIENT
Start: 2023-12-20 | End: 2024-03-14 | Stop reason: SDUPTHER

## 2023-12-20 ASSESSMENT — PATIENT HEALTH QUESTIONNAIRE - PHQ9
1. LITTLE INTEREST OR PLEASURE IN DOING THINGS: MORE THAN HALF THE DAYS
7. TROUBLE CONCENTRATING ON THINGS, SUCH AS READING THE NEWSPAPER OR WATCHING TELEVISION: SEVERAL DAYS
SUM OF ALL RESPONSES TO PHQ QUESTIONS 1-9: 7
6. FEELING BAD ABOUT YOURSELF - OR THAT YOU ARE A FAILURE OR HAVE LET YOURSELF OR YOUR FAMILY DOWN: NOT AT ALL
2. FEELING DOWN, DEPRESSED OR HOPELESS: SEVERAL DAYS
SUM OF ALL RESPONSES TO PHQ9 QUESTIONS 1 AND 2: 3
9. THOUGHTS THAT YOU WOULD BE BETTER OFF DEAD, OR OF HURTING YOURSELF: NOT AT ALL
4. FEELING TIRED OR HAVING LITTLE ENERGY: SEVERAL DAYS
10. IF YOU CHECKED OFF ANY PROBLEMS, HOW DIFFICULT HAVE THESE PROBLEMS MADE IT FOR YOU TO DO YOUR WORK, TAKE CARE OF THINGS AT HOME, OR GET ALONG WITH OTHER PEOPLE: SOMEWHAT DIFFICULT
8. MOVING OR SPEAKING SO SLOWLY THAT OTHER PEOPLE COULD HAVE NOTICED. OR THE OPPOSITE, BEING SO FIGETY OR RESTLESS THAT YOU HAVE BEEN MOVING AROUND A LOT MORE THAN USUAL: NOT AT ALL
3. TROUBLE FALLING OR STAYING ASLEEP OR SLEEPING TOO MUCH: SEVERAL DAYS
5. POOR APPETITE OR OVEREATING: SEVERAL DAYS

## 2023-12-20 ASSESSMENT — ANXIETY QUESTIONNAIRES
5. BEING SO RESTLESS THAT IT IS HARD TO SIT STILL: NOT AT ALL
IF YOU CHECKED OFF ANY PROBLEMS ON THIS QUESTIONNAIRE, HOW DIFFICULT HAVE THESE PROBLEMS MADE IT FOR YOU TO DO YOUR WORK, TAKE CARE OF THINGS AT HOME, OR GET ALONG WITH OTHER PEOPLE: SOMEWHAT DIFFICULT
7. FEELING AFRAID AS IF SOMETHING AWFUL MIGHT HAPPEN: NOT AT ALL
2. NOT BEING ABLE TO STOP OR CONTROL WORRYING: NOT AT ALL
3. WORRYING TOO MUCH ABOUT DIFFERENT THINGS: NOT AT ALL
6. BECOMING EASILY ANNOYED OR IRRITABLE: SEVERAL DAYS
GAD7 TOTAL SCORE: 3
1. FEELING NERVOUS, ANXIOUS, OR ON EDGE: SEVERAL DAYS
4. TROUBLE RELAXING: SEVERAL DAYS

## 2023-12-20 ASSESSMENT — ENCOUNTER SYMPTOMS
ACTIVITY CHANGE: 0
AGITATION: 0
NERVOUS/ANXIOUS: 0
SORE THROAT: 0
UNEXPECTED WEIGHT CHANGE: 0

## 2023-12-20 NOTE — ASSESSMENT & PLAN NOTE
Responded well to lexapro almost immediately  Recently has noticed return of irritable and impulsive reactions  Increase Lexapro to 15 mg daily from 10 mg.  Contact if not respond as hope  Follow up 3 months

## 2023-12-20 NOTE — PROGRESS NOTES
"Subjective   Patient ID: Lien Lee is a 48 year old female here for a follow-up for chronic health conditions.    She would like to increase her Lexapro dose. She still feels \"edgy\" at times over the last month. She does feel that the medication overall is helping a lot, but she still feels there is room for improvement. She is not under any stress currently. There have been times where she has become frustrated easily. She is still exercising about 5 days a week. She has gained 3 pounds but she admits it is do to her diet with the holidays.   She is also complaining of a sore neck for about a week. Her throat does not hurt.  She recently saw her gynecologist. She is on estrogen therapy.           Review of Systems   Constitutional:  Negative for activity change and unexpected weight change.   HENT:  Negative for sore throat.    Psychiatric/Behavioral:  Negative for agitation. The patient is not nervous/anxious.        Objective   /84 (BP Location: Right arm, Patient Position: Sitting, BP Cuff Size: Adult)   Pulse 73   Temp 37 °C (98.6 °F)   Resp 14   Wt 63.9 kg (140 lb 14.4 oz)   SpO2 99%   BMI 22.09 kg/m²     Physical Exam  Constitutional:       Appearance: Normal appearance.   Neck:      Thyroid: No thyroid mass.   Neurological:      Mental Status: She is alert.           Problem List Items Addressed This Visit       Anxiety - Primary     Responded well to lexapro almost immediately  Recently has noticed return of irritable and impulsive reactions  Increase Lexapro to 15 mg daily from 10 mg.  Contact if not respond as hope  Follow up 3 months         Elevated BP without diagnosis of hypertension     BP Readings from Last 1 Encounters:   12/20/23 135/84   Stable  No indication to treat bp          Scribe Attestation  By signing my name below, INuzhat Scribe   attest that this documentation has been prepared under the direction and in the presence of Caleb Hudson MD.    "

## 2024-02-12 ENCOUNTER — TELEPHONE (OUTPATIENT)
Dept: PRIMARY CARE | Facility: CLINIC | Age: 49
End: 2024-02-12
Payer: COMMERCIAL

## 2024-02-12 DIAGNOSIS — M43.6 NECK STIFFNESS: Primary | ICD-10-CM

## 2024-03-07 ENCOUNTER — HOSPITAL ENCOUNTER (OUTPATIENT)
Dept: VASCULAR MEDICINE | Facility: CLINIC | Age: 49
Discharge: HOME | End: 2024-03-07
Payer: COMMERCIAL

## 2024-03-07 ENCOUNTER — OFFICE VISIT (OUTPATIENT)
Dept: PRIMARY CARE | Facility: CLINIC | Age: 49
End: 2024-03-07
Payer: COMMERCIAL

## 2024-03-07 ENCOUNTER — LAB (OUTPATIENT)
Dept: LAB | Facility: LAB | Age: 49
End: 2024-03-07
Payer: COMMERCIAL

## 2024-03-07 ENCOUNTER — HOSPITAL ENCOUNTER (OUTPATIENT)
Dept: RADIOLOGY | Facility: CLINIC | Age: 49
Discharge: HOME | End: 2024-03-07
Payer: COMMERCIAL

## 2024-03-07 ENCOUNTER — APPOINTMENT (OUTPATIENT)
Dept: VASCULAR MEDICINE | Facility: HOSPITAL | Age: 49
End: 2024-03-07
Payer: COMMERCIAL

## 2024-03-07 VITALS
TEMPERATURE: 98 F | HEART RATE: 80 BPM | BODY MASS INDEX: 22.01 KG/M2 | RESPIRATION RATE: 16 BRPM | DIASTOLIC BLOOD PRESSURE: 84 MMHG | WEIGHT: 140.4 LBS | SYSTOLIC BLOOD PRESSURE: 129 MMHG | OXYGEN SATURATION: 98 %

## 2024-03-07 DIAGNOSIS — M25.522 LEFT ELBOW PAIN: ICD-10-CM

## 2024-03-07 DIAGNOSIS — M13.122: ICD-10-CM

## 2024-03-07 DIAGNOSIS — R60.0 LOCALIZED EDEMA: ICD-10-CM

## 2024-03-07 DIAGNOSIS — M25.422 SWELLING OF LEFT ELBOW JOINT: ICD-10-CM

## 2024-03-07 DIAGNOSIS — M25.522 LEFT ELBOW PAIN: Primary | ICD-10-CM

## 2024-03-07 DIAGNOSIS — M13.122: Primary | ICD-10-CM

## 2024-03-07 LAB
ANION GAP SERPL CALC-SCNC: 12 MMOL/L (ref 10–20)
BASOPHILS # BLD AUTO: 0.08 X10*3/UL (ref 0–0.1)
BASOPHILS NFR BLD AUTO: 1 %
BUN SERPL-MCNC: 12 MG/DL (ref 6–23)
CALCIUM SERPL-MCNC: 10.3 MG/DL (ref 8.6–10.6)
CCP IGG SERPL-ACNC: <1 U/ML
CHLORIDE SERPL-SCNC: 102 MMOL/L (ref 98–107)
CO2 SERPL-SCNC: 32 MMOL/L (ref 21–32)
CREAT SERPL-MCNC: 0.73 MG/DL (ref 0.5–1.05)
CRP SERPL-MCNC: 0.15 MG/DL
EGFRCR SERPLBLD CKD-EPI 2021: >90 ML/MIN/1.73M*2
EOSINOPHIL # BLD AUTO: 0.43 X10*3/UL (ref 0–0.7)
EOSINOPHIL NFR BLD AUTO: 5.3 %
ERYTHROCYTE [DISTWIDTH] IN BLOOD BY AUTOMATED COUNT: 12.1 % (ref 11.5–14.5)
ERYTHROCYTE [SEDIMENTATION RATE] IN BLOOD BY WESTERGREN METHOD: 12 MM/H (ref 0–20)
GLUCOSE SERPL-MCNC: 86 MG/DL (ref 74–99)
HCT VFR BLD AUTO: 41.1 % (ref 36–46)
HGB BLD-MCNC: 14.1 G/DL (ref 12–16)
IMM GRANULOCYTES # BLD AUTO: 0.02 X10*3/UL (ref 0–0.7)
IMM GRANULOCYTES NFR BLD AUTO: 0.2 % (ref 0–0.9)
LYMPHOCYTES # BLD AUTO: 2.46 X10*3/UL (ref 1.2–4.8)
LYMPHOCYTES NFR BLD AUTO: 30.1 %
MCH RBC QN AUTO: 30.3 PG (ref 26–34)
MCHC RBC AUTO-ENTMCNC: 34.3 G/DL (ref 32–36)
MCV RBC AUTO: 88 FL (ref 80–100)
MONOCYTES # BLD AUTO: 0.51 X10*3/UL (ref 0.1–1)
MONOCYTES NFR BLD AUTO: 6.2 %
NEUTROPHILS # BLD AUTO: 4.68 X10*3/UL (ref 1.2–7.7)
NEUTROPHILS NFR BLD AUTO: 57.2 %
NRBC BLD-RTO: 0 /100 WBCS (ref 0–0)
PLATELET # BLD AUTO: 276 X10*3/UL (ref 150–450)
POTASSIUM SERPL-SCNC: 4.5 MMOL/L (ref 3.5–5.3)
RBC # BLD AUTO: 4.66 X10*6/UL (ref 4–5.2)
RHEUMATOID FACT SER NEPH-ACNC: <10 IU/ML (ref 0–15)
SODIUM SERPL-SCNC: 141 MMOL/L (ref 136–145)
URATE SERPL-MCNC: 3.3 MG/DL (ref 2.3–6.7)
WBC # BLD AUTO: 8.2 X10*3/UL (ref 4.4–11.3)

## 2024-03-07 PROCEDURE — 99214 OFFICE O/P EST MOD 30 MIN: CPT | Performed by: FAMILY MEDICINE

## 2024-03-07 PROCEDURE — 86038 ANTINUCLEAR ANTIBODIES: CPT

## 2024-03-07 PROCEDURE — 85652 RBC SED RATE AUTOMATED: CPT

## 2024-03-07 PROCEDURE — 73080 X-RAY EXAM OF ELBOW: CPT | Mod: LT

## 2024-03-07 PROCEDURE — 84550 ASSAY OF BLOOD/URIC ACID: CPT

## 2024-03-07 PROCEDURE — 80048 BASIC METABOLIC PNL TOTAL CA: CPT

## 2024-03-07 PROCEDURE — 87476 LYME DIS DNA AMP PROBE: CPT

## 2024-03-07 PROCEDURE — 1036F TOBACCO NON-USER: CPT | Performed by: FAMILY MEDICINE

## 2024-03-07 PROCEDURE — 85025 COMPLETE CBC W/AUTO DIFF WBC: CPT

## 2024-03-07 PROCEDURE — 86618 LYME DISEASE ANTIBODY: CPT

## 2024-03-07 PROCEDURE — 86431 RHEUMATOID FACTOR QUANT: CPT

## 2024-03-07 PROCEDURE — 36415 COLL VENOUS BLD VENIPUNCTURE: CPT

## 2024-03-07 PROCEDURE — 73080 X-RAY EXAM OF ELBOW: CPT | Mod: LEFT SIDE | Performed by: STUDENT IN AN ORGANIZED HEALTH CARE EDUCATION/TRAINING PROGRAM

## 2024-03-07 PROCEDURE — 86140 C-REACTIVE PROTEIN: CPT

## 2024-03-07 PROCEDURE — 86200 CCP ANTIBODY: CPT

## 2024-03-07 PROCEDURE — 93971 EXTREMITY STUDY: CPT | Performed by: INTERNAL MEDICINE

## 2024-03-07 PROCEDURE — 93971 EXTREMITY STUDY: CPT

## 2024-03-07 RX ORDER — DOXYCYCLINE 100 MG/1
100 CAPSULE ORAL 2 TIMES DAILY
Qty: 14 CAPSULE | Refills: 0 | Status: SHIPPED | OUTPATIENT
Start: 2024-03-07 | End: 2024-03-14

## 2024-03-07 ASSESSMENT — ENCOUNTER SYMPTOMS
ARTHRALGIAS: 1
COLOR CHANGE: 1
MYALGIAS: 1
PALPITATIONS: 0
FATIGUE: 0
COUGH: 0
SHORTNESS OF BREATH: 0
WOUND: 0
CHEST TIGHTNESS: 1
CHILLS: 0
JOINT SWELLING: 1
FEVER: 0

## 2024-03-07 NOTE — PROGRESS NOTES
Subjective   Patient ID: Lien Lee is a 48 y.o. female who presents for Arm Pain (Sore and swollen started Saturday or Sunday /Some redness).    HPI     PCP- Tristan    She is here today with left arm swelling (upper arm and elbow region), slight redness, pain, feels sore   Denies injury   Thinks started over the weekend on Saturday or Sunday   She was helping her  move furniture but no injury occurred   Started as an ache in the muscles   She does do pilates as well  Left shoulder pain comes and goes, has done PT in past for this for some mild arthritis   Left medial elbow is tender   Erythema elbow   Hx self diagnosed gout in toe previously   No recent IVs  History of VTE: No   Family hx of VTE: No   Hx of antiphospholipid syndrome: No   Hx of factor V leiden, prothrombin gene mutation, protein S deficiency, protein C deficiency, antithrombin deficiency:  No   History of cancer: BCC   Immobility: No   Recent surgery: No   Trauma: No   Hx hysterectomy ; still has ovaries   Oral contraceptives or hormonal therapy:  Imvexxy twice weekly   CHF: No   IBD (UC or crohns):  No   Nephrotic syndrome: No       Current Outpatient Medications   Medication Sig Dispense Refill    clindamycin (Cleocin T) 1 % lotion 1 APPLICATION TWICE A DAY TOPICALLY APPLY TO AFFECTED AREAS 2X DAILY      escitalopram (Lexapro) 10 mg tablet Take 1.5 tablets (15 mg) by mouth once daily. (Patient taking differently: Take 1 tablet (10 mg) by mouth once daily.) 45 tablet 2    FLAXSEED ORAL Take by mouth.      Imvexxy Maintenance Pack 4 mcg insert Insert 4 mcg into the vagina 2 times a week. 24 each 3    multivitamin tablet Womens Daily Multivitamin TABS      NUTRITIONAL SUPPLEMENTS ORAL Collagenase POWD      valACYclovir (Valtrex) 1 gram tablet Take by mouth.      vit D3-vit K-berberine-hops 343-288- unit-mcg-mg-mg tablet Take by mouth.      wheat dextrin (BENEFIBER CLEAR SF, DEXTRIN, ORAL) Benefiber POWD       No current  facility-administered medications for this visit.       Review of Systems   Constitutional:  Negative for chills, fatigue and fever.   Respiratory:  Positive for chest tightness. Negative for cough and shortness of breath.    Cardiovascular:  Negative for chest pain, palpitations and leg swelling.   Musculoskeletal:  Positive for arthralgias, joint swelling and myalgias.   Skin:  Positive for color change. Negative for wound.         Objective   /84 (BP Location: Right arm, Patient Position: Sitting, BP Cuff Size: Adult)   Pulse 80   Temp 36.7 °C (98 °F) (Temporal)   Resp 16   Wt 63.7 kg (140 lb 6.4 oz)   SpO2 98%   BMI 22.01 kg/m²     Physical Exam    Constitutional: Well developed, well nourished, alert and in no acute distress   Eyes: Normal external exam.   Cardiovascular: Regular rate and rhythm, normal S1 and S2, no murmurs, gallops, or rubs. Radial pulses normal. No peripheral edema.  Pulmonary: No respiratory distress, lungs clear to auscultation bilaterally. No wheezes, rhonchi, rales.  Left elbow: Faint erythema medially, +warmth, + swelling noted (mostly localized around elbow region, some in upper arm as well). + tenderness to palpation of medial epicondyle. Reduced range of motion (full flexion), +tenderness with movement as well. + pain with wrist movement. Sensation intact. Radial pulses intact.   Skin: Warm, well perfused.   Neurologic: Cranial nerves II-XII grossly intact.   Psychiatric: Mood calm and affect normal.      Assessment/Plan   Problem List Items Addressed This Visit    None  Visit Diagnoses         Codes    Left elbow pain    -  Primary M25.522    Relevant Orders    Vascular US upper extremity venous duplex left    XR elbow left 3+ views    Referral to Orthopaedic Surgery    Swelling of left elbow joint     M25.422    Relevant Orders    Vascular US upper extremity venous duplex left    Referral to Orthopaedic Surgery    Monoarthritis of left elbow     M13.122    Relevant  Orders    CBC and Auto Differential    C-Reactive Protein    Sedimentation Rate    Basic Metabolic Panel    Uric acid    Lyme AB Modified 2-Tier Testing, 1st Tier    Lyme disease, PCR    Rheumatoid Factor    Citrulline Antibody, IgG    MIGUEL with Reflex to COLLEEN        Stat U/S rule out DVT - no known risk factors   Xray and labs ordered  Stat referral ortho  Discussed differential of DVT vs infectious arthritis vs rheumatologic vs other         Bernie Lobo, DO  3/7/2024

## 2024-03-08 ENCOUNTER — APPOINTMENT (OUTPATIENT)
Dept: VASCULAR MEDICINE | Facility: HOSPITAL | Age: 49
End: 2024-03-08
Payer: COMMERCIAL

## 2024-03-08 ENCOUNTER — TELEPHONE (OUTPATIENT)
Dept: PRIMARY CARE | Facility: CLINIC | Age: 49
End: 2024-03-08
Payer: COMMERCIAL

## 2024-03-08 LAB — ANA SER QL HEP2 SUBST: NEGATIVE

## 2024-03-08 NOTE — TELEPHONE ENCOUNTER
Received this message after Dr. Lobo left so doing my best to answer without seeing the arm, only reading the description from her note. It does sound possible to be bursitis or a tendonitis at the elbow. Agree with referral to ortho for further evaluation based on the description in the note.

## 2024-03-08 NOTE — TELEPHONE ENCOUNTER
Patient with seen by Dr. Lobo yesterday for questionable arm swelling    Has taken two doxycycline    States swelling it not any worse but has not gone down at all; pain level is the same, very tender to touch; still red/warm     Just wanted to call with an update    She is asking if there is any chance this could be bursitis?

## 2024-03-10 LAB
B BURGDOR DNA SPEC QL NAA+PROBE: NOT DETECTED
B BURGDOR.VLSE1+PEPC10 AB SER IA-ACNC: 0.27 IV
SPECIMEN SOURCE: NORMAL

## 2024-03-11 ENCOUNTER — OFFICE VISIT (OUTPATIENT)
Dept: ORTHOPEDIC SURGERY | Facility: CLINIC | Age: 49
End: 2024-03-11
Payer: COMMERCIAL

## 2024-03-11 VITALS — WEIGHT: 138 LBS | BODY MASS INDEX: 21.66 KG/M2 | HEIGHT: 67 IN

## 2024-03-11 DIAGNOSIS — M25.522 LEFT ELBOW PAIN: ICD-10-CM

## 2024-03-11 DIAGNOSIS — M25.422 SWELLING OF LEFT ELBOW JOINT: ICD-10-CM

## 2024-03-11 PROCEDURE — 1036F TOBACCO NON-USER: CPT | Performed by: ORTHOPAEDIC SURGERY

## 2024-03-11 PROCEDURE — 99203 OFFICE O/P NEW LOW 30 MIN: CPT | Performed by: ORTHOPAEDIC SURGERY

## 2024-03-11 NOTE — PROGRESS NOTES
Subjective    Patient ID: Lien Lee is a 48 y.o. female.    Chief Complaint: Pain of the Left Elbow (X1WK/WAS LIFTING HEAVY BAGS OF CLOTHES)     Last Surgery: No surgery found  Last Surgery Date: No surgery found    YANE  Is a 48-year-old right-hand-dominant female who comes in with about a 1 week history of left elbow pain.  The pain was medial in nature.  She does not recall any specific trauma.  She did notice the pain after performing a weight lifting exercise and yoga as well as lifting multiple heavy bags.  She noticed erythema in that same region.  She has undergone x-rays as well as an ultrasound.  She states that over the past 3 days her symptoms are improving.  She was started on doxycycline by her primary care provider.  She is not sure if this is also helping with the erythema or if it was just a matter of timing.      Objective   Ortho Exam  Patient is in no acute distress.  Exam of her left upper extremity reveals her skin envelope is intact.  She has mild swelling near the antecubital fossa.  However there is no warmth or erythema today.  Her biceps tendon is clinically intact within the cubital fossa.  She has mild increased pain with resisted wrist and finger flexion.  She has no tenderness over the lateral epicondyle.    Image Results:  Vascular US upper extremity venous duplex left                   [facility]  [address], [address], [facility] [address]                   Tel [phone]       Vascular Lab Report  [facility] US UPPER EXTREMITY VENOUS DUPLEX LEFT       Patient Name:      LIEN LEE     Reading Physician:  [id number] Akin Bello MD, RPVI  Study Date:        3/7/2024             Ordering Physician: [id number] ANA MARÍA KUNZ  MRN/PID:           [id number]             Technologist:       Vanessa Maldonado RVT,                                                               [facility]  Accession#:        [id number]         Technologist 2:  Date of Birth/Age: 1975 / 48 years Encounter#:         [id number]  Gender:            F  Admission Status:  Outpatient           Location Performed: [facility]       Diagnosis/ICD: Localized (leg) edema-R60.0  CPT Codes:     93372 Peripheral venous duplex scan for DVT Limited       CONCLUSIONS:  Right Upper Venous: The subclavian vein demonstrates a normal spontaneous and phasic flow.  Left Upper Venous: No evidence of acute deep vein thrombus visualized in the left upper extremity. The proximal radial veins appear small, however appear to compress.     Imaging & Doppler Findings:     Right               Thrombus        Flow  Subclavian Proximal   None   Spontaneous/Phasic       Left                Compress Thrombus        Flow  Internal Jugular      Yes      None   Spontaneous/Phasic  Subclavian            Yes      None  Subclavian Proximal   Yes      None   Spontaneous/Phasic  Subclavian Mid        Yes      None  Subclavian Distal     Yes      None  Axillary              Yes      None   Spontaneous/Phasic  Brachial              Yes      None  Cephalic              Yes      None  Basilic               Yes      None       [id number] Akin Bello MD, RPVI  Electronically signed by [id number] Akin Bello MD, RPYAIR on 3/7/2024 at 5:20:51 PM       ** Final **  XR elbow left 3+ views  Narrative: Interpreted By:  Logan Powers,   STUDY:  XR ELBOW LEFT 3+ VIEWS;  3/7/2024 12:56 pm      INDICATION:  Signs/Symptoms:pain and swelling, no injury.      COMPARISON:  None.      ACCESSION NUMBER(S):  [id number]      ORDERING CLINICIAN:  ANA MARÍA KUNZ      FINDINGS:  No acute fracture or dislocation. Joint spaces are maintained. No  joint effusion.      Impression: Normal radiographs.          MACRO  None      Signed by: oLgan Powers 3/7/2024 2:13 PM  Dictation workstation:   [id number]      Assessment/Plan   Encounter  Diagnoses:  Left elbow pain    Swelling of left elbow joint    Patient has left elbow pain which is now resolving.  Explained the patient she may have had a mild case of cellulitis which is improving with the doxycycline.  However she may also have had a strain of her flexor pronator mass which is resolving as she has been resting her elbow.  At this time I educated patient on stretching that can be performed.  She otherwise will follow-up as her symptoms dictate.

## 2024-03-14 ENCOUNTER — OFFICE VISIT (OUTPATIENT)
Dept: PRIMARY CARE | Facility: CLINIC | Age: 49
End: 2024-03-14
Payer: COMMERCIAL

## 2024-03-14 VITALS
HEART RATE: 83 BPM | DIASTOLIC BLOOD PRESSURE: 76 MMHG | OXYGEN SATURATION: 98 % | TEMPERATURE: 98 F | BODY MASS INDEX: 21.77 KG/M2 | SYSTOLIC BLOOD PRESSURE: 119 MMHG | RESPIRATION RATE: 12 BRPM | WEIGHT: 139 LBS

## 2024-03-14 DIAGNOSIS — F41.9 ANXIETY: ICD-10-CM

## 2024-03-14 DIAGNOSIS — K59.04 CHRONIC IDIOPATHIC CONSTIPATION: ICD-10-CM

## 2024-03-14 DIAGNOSIS — R03.0 ELEVATED BP WITHOUT DIAGNOSIS OF HYPERTENSION: Primary | ICD-10-CM

## 2024-03-14 DIAGNOSIS — Z00.00 WELLNESS EXAMINATION: ICD-10-CM

## 2024-03-14 PROCEDURE — 99396 PREV VISIT EST AGE 40-64: CPT | Performed by: FAMILY MEDICINE

## 2024-03-14 PROCEDURE — 1036F TOBACCO NON-USER: CPT | Performed by: FAMILY MEDICINE

## 2024-03-14 RX ORDER — ESCITALOPRAM OXALATE 10 MG/1
10 TABLET ORAL DAILY
Qty: 90 TABLET | Refills: 1 | Status: SHIPPED | OUTPATIENT
Start: 2024-03-14 | End: 2024-09-10

## 2024-03-14 ASSESSMENT — ENCOUNTER SYMPTOMS
MYALGIAS: 1
DIARRHEA: 0
CONSTIPATION: 0
FREQUENCY: 0
NERVOUS/ANXIOUS: 0

## 2024-03-14 ASSESSMENT — PATIENT HEALTH QUESTIONNAIRE - PHQ9
SUM OF ALL RESPONSES TO PHQ9 QUESTIONS 1 AND 2: 1
2. FEELING DOWN, DEPRESSED OR HOPELESS: NOT AT ALL
1. LITTLE INTEREST OR PLEASURE IN DOING THINGS: SEVERAL DAYS

## 2024-03-14 NOTE — ASSESSMENT & PLAN NOTE
BP Readings from Last 1 Encounters:   03/14/24 119/76   The goal range for blood pressure is below 130/80.   We will continue to monitor.   Stable  No indication to treat bp

## 2024-03-14 NOTE — PROGRESS NOTES
Subjective   Patient ID: Lien Lee is a 48 y.o. female who presents for annual physical/wellness visit.    She signed document informing that if problem issues also address at today's wellness visit that insurance may be appropriately billed so co-pay and deductible out of pocket expenses may occur.    Colorectal cancer screen: 07/26/2023  Mammogram: 11/17/2023  Pap: N/A   Last Menstrual Period: hysto   Tdap: 03/10/2023   HIV screen: 11/4/2020  Hepatitis C screen: 11/4/2020  Hepatitis B vaccine: never done    Would like to discuss the Lexapro and left elbow pain     She did see Dr. Fernandes, orthopedic surgeon, for her left elbow. He stated she may have had a mild case of cellulitis, all x-rays and other images were normal, nothing broken. Dr. Lobo prescribed doxycycline which seemed to calm down the erythema and swelling. It is still slightly swollen but not bothering her as much. She has 3 more doses of the doxycycline to finish.   She was taking Lexapro 15 mg, she ended up decreasing it to 10 mg because she felt like she had no motivation on the higher dose. She seems to be doing better now. The change was made mid February.  She is using Benefiber daily, she has bowel movements every other day with no complications.   She has a polyp in her left nostril, it has been there for about one month. It was not painful or tender. She was using Flonase daily.  She sees a dentist and eye doctor regularly.          Review of Systems   Gastrointestinal:  Negative for constipation and diarrhea.   Genitourinary:  Negative for frequency and urgency.   Musculoskeletal:  Positive for myalgias (left elbow).   Psychiatric/Behavioral:  The patient is not nervous/anxious.        Objective   /76 (BP Location: Right arm, Patient Position: Sitting, BP Cuff Size: Adult)   Pulse 83   Temp 36.7 °C (98 °F) (Temporal)   Resp 12   Wt 63 kg (139 lb)   SpO2 98%   BMI 21.77 kg/m²     Physical Exam  Constitutional:        "Appearance: Normal appearance.   Cardiovascular:      Rate and Rhythm: Normal rate and regular rhythm.      Pulses: Normal pulses.      Heart sounds: Normal heart sounds.   Pulmonary:      Effort: Pulmonary effort is normal.      Breath sounds: Normal breath sounds.   Abdominal:      General: Abdomen is flat. Bowel sounds are normal.      Palpations: Abdomen is soft.   Neurological:      General: No focal deficit present.      Mental Status: She is alert.   Psychiatric:         Mood and Affect: Mood normal.         Behavior: Behavior normal.         Thought Content: Thought content normal.         Judgment: Judgment normal.         Assessment/Plan   Problem List Items Addressed This Visit       Constipation     Stable with every other day easy to pass BM  Continue Benefiber with natural laxative such as apple juice or prune juice. Use 1 heaping tablespoon in water or juice per day.  You could also try using or adding psyllium husk fiber (such as Metamucil -- 1 heaping tablespoon in glass of water/juice)         Anxiety     Responded well to lexapro almost immediately, increased dose to 15 mg at previous appointment.  Patient decreased to 10 mg mid February, would like to stay at that dose.         RESOLVED: Elevated BP without diagnosis of hypertension - Primary     BP Readings from Last 1 Encounters:   03/14/24 119/76   The goal range for blood pressure is below 130/80.   We will continue to monitor.   Stable  No indication to treat bp         Relevant Medications    escitalopram (Lexapro) 10 mg tablet    Wellness examination    Relevant Orders    Lipid Panel     Tips for your general wellness...;  Remember importance of daily aerobic exercise for 30minutes to help with both your physical and mental/emotional health.  ;  Take time twice a day to relax with focused breathing and relaxation.  A good source to learn \"mindfulness\" relaxation is a book \"Mindfulness for Beginners\" by Jsoe Clark.  ;    Strive for " healthy eating with plenty of water, fruits, vegetables, and choosing as much plant based diet as able  If do consume non plant protein, choose fish high in omega (like salmon or cod), skinless poultry, and rarely anything from a cow  Avoid processed foods.  ;  Shop the perimeter of the grocery store  - not the inner aisles where all the boxed, bagged, and canned process non natural foods are   Avoid sugar - including fruit juices with added sugar and avoid artificial sweeteners (sucralose, aspartame).; if want to use sweetener, use stevia (natural plant based non caloric sweetener)  Recommended guided nutrition plans include Mediterranean Diet - online resources available     Get plenty of sleep nightly - 7 hours minimum;    Exercise 150min per week;    Do not use tobacco    Abstain or limit alcohol to 1-2 drinks per 24 hours    See your dentist at least yearly    Have an eye check at least every 5 years    Follow up 1 year for annual wellness checkup;    Scribe Attestation  By signing my name below, INuzhat , Gita   attest that this documentation has been prepared under the direction and in the presence of Caleb Hudson MD.

## 2024-03-14 NOTE — ASSESSMENT & PLAN NOTE
Responded well to lexapro almost immediately, increased dose to 15 mg at previous appointment.  Patient decreased to 10 mg mid February, would like to stay at that dose.

## 2024-03-19 NOTE — ASSESSMENT & PLAN NOTE
Stable with every other day easy to pass BM  Continue Benefiber with natural laxative such as apple juice or prune juice. Use 1 heaping tablespoon in water or juice per day.  You could also try using or adding psyllium husk fiber (such as Metamucil -- 1 heaping tablespoon in glass of water/juice)

## 2024-05-31 ENCOUNTER — APPOINTMENT (OUTPATIENT)
Dept: RADIOLOGY | Facility: HOSPITAL | Age: 49
End: 2024-05-31
Payer: COMMERCIAL

## 2024-05-31 ENCOUNTER — HOSPITAL ENCOUNTER (OUTPATIENT)
Facility: HOSPITAL | Age: 49
Setting detail: OBSERVATION
Discharge: HOME | End: 2024-06-02
Attending: STUDENT IN AN ORGANIZED HEALTH CARE EDUCATION/TRAINING PROGRAM | Admitting: INTERNAL MEDICINE
Payer: COMMERCIAL

## 2024-05-31 DIAGNOSIS — N12 PYELONEPHRITIS: Primary | ICD-10-CM

## 2024-05-31 DIAGNOSIS — N10 ACUTE PYELONEPHRITIS: ICD-10-CM

## 2024-05-31 LAB
ALBUMIN SERPL BCP-MCNC: 4.3 G/DL (ref 3.4–5)
ALP SERPL-CCNC: 68 U/L (ref 33–110)
ALT SERPL W P-5'-P-CCNC: 24 U/L (ref 7–45)
ANION GAP SERPL CALC-SCNC: 13 MMOL/L (ref 10–20)
APPEARANCE UR: CLEAR
AST SERPL W P-5'-P-CCNC: 23 U/L (ref 9–39)
B-HCG SERPL-ACNC: 5 MIU/ML
BASOPHILS # BLD AUTO: 0.03 X10*3/UL (ref 0–0.1)
BASOPHILS NFR BLD AUTO: 0.3 %
BILIRUB SERPL-MCNC: 0.5 MG/DL (ref 0–1.2)
BILIRUB UR STRIP.AUTO-MCNC: NEGATIVE MG/DL
BUN SERPL-MCNC: 8 MG/DL (ref 6–23)
CALCIUM SERPL-MCNC: 9.4 MG/DL (ref 8.6–10.3)
CHLORIDE SERPL-SCNC: 99 MMOL/L (ref 98–107)
CO2 SERPL-SCNC: 27 MMOL/L (ref 21–32)
COLOR UR: COLORLESS
CREAT SERPL-MCNC: 0.84 MG/DL (ref 0.5–1.05)
EGFRCR SERPLBLD CKD-EPI 2021: 85 ML/MIN/1.73M*2
EOSINOPHIL # BLD AUTO: 0.06 X10*3/UL (ref 0–0.7)
EOSINOPHIL NFR BLD AUTO: 0.6 %
ERYTHROCYTE [DISTWIDTH] IN BLOOD BY AUTOMATED COUNT: 11.8 % (ref 11.5–14.5)
GLUCOSE SERPL-MCNC: 87 MG/DL (ref 74–99)
GLUCOSE UR STRIP.AUTO-MCNC: NORMAL MG/DL
HCT VFR BLD AUTO: 39.3 % (ref 36–46)
HGB BLD-MCNC: 13.5 G/DL (ref 12–16)
IMM GRANULOCYTES # BLD AUTO: 0.06 X10*3/UL (ref 0–0.7)
IMM GRANULOCYTES NFR BLD AUTO: 0.6 % (ref 0–0.9)
KETONES UR STRIP.AUTO-MCNC: NEGATIVE MG/DL
LACTATE SERPL-SCNC: 0.6 MMOL/L (ref 0.4–2)
LEUKOCYTE ESTERASE UR QL STRIP.AUTO: ABNORMAL
LIPASE SERPL-CCNC: 14 U/L (ref 9–82)
LYMPHOCYTES # BLD AUTO: 1.55 X10*3/UL (ref 1.2–4.8)
LYMPHOCYTES NFR BLD AUTO: 14.6 %
MCH RBC QN AUTO: 31 PG (ref 26–34)
MCHC RBC AUTO-ENTMCNC: 34.4 G/DL (ref 32–36)
MCV RBC AUTO: 90 FL (ref 80–100)
MONOCYTES # BLD AUTO: 1.28 X10*3/UL (ref 0.1–1)
MONOCYTES NFR BLD AUTO: 12 %
NEUTROPHILS # BLD AUTO: 7.65 X10*3/UL (ref 1.2–7.7)
NEUTROPHILS NFR BLD AUTO: 71.9 %
NITRITE UR QL STRIP.AUTO: NEGATIVE
NRBC BLD-RTO: 0 /100 WBCS (ref 0–0)
PH UR STRIP.AUTO: 6.5 [PH]
PLATELET # BLD AUTO: 264 X10*3/UL (ref 150–450)
POTASSIUM SERPL-SCNC: 3.8 MMOL/L (ref 3.5–5.3)
PROT SERPL-MCNC: 8.1 G/DL (ref 6.4–8.2)
PROT UR STRIP.AUTO-MCNC: NEGATIVE MG/DL
RBC # BLD AUTO: 4.36 X10*6/UL (ref 4–5.2)
RBC # UR STRIP.AUTO: ABNORMAL /UL
RBC #/AREA URNS AUTO: ABNORMAL /HPF
SODIUM SERPL-SCNC: 135 MMOL/L (ref 136–145)
SP GR UR STRIP.AUTO: 1
SQUAMOUS #/AREA URNS AUTO: ABNORMAL /HPF
UROBILINOGEN UR STRIP.AUTO-MCNC: NORMAL MG/DL
WBC # BLD AUTO: 10.6 X10*3/UL (ref 4.4–11.3)
WBC #/AREA URNS AUTO: >50 /HPF

## 2024-05-31 PROCEDURE — 85025 COMPLETE CBC W/AUTO DIFF WBC: CPT | Performed by: PHYSICIAN ASSISTANT

## 2024-05-31 PROCEDURE — 84702 CHORIONIC GONADOTROPIN TEST: CPT | Performed by: PHYSICIAN ASSISTANT

## 2024-05-31 PROCEDURE — G0378 HOSPITAL OBSERVATION PER HR: HCPCS

## 2024-05-31 PROCEDURE — 81003 URINALYSIS AUTO W/O SCOPE: CPT

## 2024-05-31 PROCEDURE — 71045 X-RAY EXAM CHEST 1 VIEW: CPT | Performed by: RADIOLOGY

## 2024-05-31 PROCEDURE — 96365 THER/PROPH/DIAG IV INF INIT: CPT

## 2024-05-31 PROCEDURE — 74176 CT ABD & PELVIS W/O CONTRAST: CPT | Performed by: RADIOLOGY

## 2024-05-31 PROCEDURE — 99285 EMERGENCY DEPT VISIT HI MDM: CPT

## 2024-05-31 PROCEDURE — 2500000004 HC RX 250 GENERAL PHARMACY W/ HCPCS (ALT 636 FOR OP/ED)

## 2024-05-31 PROCEDURE — 36415 COLL VENOUS BLD VENIPUNCTURE: CPT | Performed by: STUDENT IN AN ORGANIZED HEALTH CARE EDUCATION/TRAINING PROGRAM

## 2024-05-31 PROCEDURE — 2500000004 HC RX 250 GENERAL PHARMACY W/ HCPCS (ALT 636 FOR OP/ED): Performed by: STUDENT IN AN ORGANIZED HEALTH CARE EDUCATION/TRAINING PROGRAM

## 2024-05-31 PROCEDURE — 36415 COLL VENOUS BLD VENIPUNCTURE: CPT | Performed by: PHYSICIAN ASSISTANT

## 2024-05-31 PROCEDURE — 81001 URINALYSIS AUTO W/SCOPE: CPT | Performed by: STUDENT IN AN ORGANIZED HEALTH CARE EDUCATION/TRAINING PROGRAM

## 2024-05-31 PROCEDURE — 83690 ASSAY OF LIPASE: CPT | Performed by: PHYSICIAN ASSISTANT

## 2024-05-31 PROCEDURE — 71045 X-RAY EXAM CHEST 1 VIEW: CPT

## 2024-05-31 PROCEDURE — 87040 BLOOD CULTURE FOR BACTERIA: CPT | Mod: PARLAB | Performed by: STUDENT IN AN ORGANIZED HEALTH CARE EDUCATION/TRAINING PROGRAM

## 2024-05-31 PROCEDURE — 96375 TX/PRO/DX INJ NEW DRUG ADDON: CPT

## 2024-05-31 PROCEDURE — 87086 URINE CULTURE/COLONY COUNT: CPT | Mod: PARLAB | Performed by: STUDENT IN AN ORGANIZED HEALTH CARE EDUCATION/TRAINING PROGRAM

## 2024-05-31 PROCEDURE — 96361 HYDRATE IV INFUSION ADD-ON: CPT

## 2024-05-31 PROCEDURE — 84075 ASSAY ALKALINE PHOSPHATASE: CPT | Performed by: PHYSICIAN ASSISTANT

## 2024-05-31 PROCEDURE — 83605 ASSAY OF LACTIC ACID: CPT | Performed by: STUDENT IN AN ORGANIZED HEALTH CARE EDUCATION/TRAINING PROGRAM

## 2024-05-31 PROCEDURE — 74176 CT ABD & PELVIS W/O CONTRAST: CPT

## 2024-05-31 RX ORDER — POLYETHYLENE GLYCOL 3350 17 G/17G
17 POWDER, FOR SOLUTION ORAL DAILY PRN
Status: DISCONTINUED | OUTPATIENT
Start: 2024-05-31 | End: 2024-06-02 | Stop reason: HOSPADM

## 2024-05-31 RX ORDER — HYOSCYAMINE SULFATE 0.12 MG/1
0.12 TABLET, ORALLY DISINTEGRATING ORAL EVERY 4 HOURS PRN
COMMUNITY

## 2024-05-31 RX ORDER — CEFTRIAXONE 1 G/50ML
1 INJECTION, SOLUTION INTRAVENOUS EVERY 24 HOURS
Status: DISCONTINUED | OUTPATIENT
Start: 2024-06-01 | End: 2024-06-02 | Stop reason: HOSPADM

## 2024-05-31 RX ORDER — SODIUM CHLORIDE 9 MG/ML
100 INJECTION, SOLUTION INTRAVENOUS CONTINUOUS
Status: DISCONTINUED | OUTPATIENT
Start: 2024-05-31 | End: 2024-06-02 | Stop reason: HOSPADM

## 2024-05-31 RX ORDER — HEPARIN SODIUM 5000 [USP'U]/ML
5000 INJECTION, SOLUTION INTRAVENOUS; SUBCUTANEOUS EVERY 8 HOURS SCHEDULED
Status: DISCONTINUED | OUTPATIENT
Start: 2024-05-31 | End: 2024-06-02 | Stop reason: HOSPADM

## 2024-05-31 RX ORDER — KETOROLAC TROMETHAMINE 30 MG/ML
15 INJECTION, SOLUTION INTRAMUSCULAR; INTRAVENOUS ONCE
Status: COMPLETED | OUTPATIENT
Start: 2024-05-31 | End: 2024-05-31

## 2024-05-31 RX ORDER — CEFTRIAXONE 2 G/50ML
2 INJECTION, SOLUTION INTRAVENOUS ONCE
Status: COMPLETED | OUTPATIENT
Start: 2024-05-31 | End: 2024-05-31

## 2024-05-31 RX ADMIN — KETOROLAC TROMETHAMINE 15 MG: 30 INJECTION, SOLUTION INTRAMUSCULAR at 22:09

## 2024-05-31 RX ADMIN — CEFTRIAXONE SODIUM 2 G: 2 INJECTION, SOLUTION INTRAVENOUS at 20:12

## 2024-05-31 RX ADMIN — SODIUM CHLORIDE 1000 ML: 9 INJECTION, SOLUTION INTRAVENOUS at 20:11

## 2024-05-31 ASSESSMENT — ACTIVITIES OF DAILY LIVING (ADL)
FEEDING YOURSELF: INDEPENDENT
GROOMING: INDEPENDENT
HEARING - LEFT EAR: FUNCTIONAL
JUDGMENT_ADEQUATE_SAFELY_COMPLETE_DAILY_ACTIVITIES: YES
BATHING: INDEPENDENT
TOILETING: INDEPENDENT
WALKS IN HOME: INDEPENDENT
HEARING - RIGHT EAR: FUNCTIONAL
DRESSING YOURSELF: INDEPENDENT
ASSISTIVE_DEVICE: EYEGLASSES
LACK_OF_TRANSPORTATION: NO
PATIENT'S MEMORY ADEQUATE TO SAFELY COMPLETE DAILY ACTIVITIES?: YES
ADEQUATE_TO_COMPLETE_ADL: YES

## 2024-05-31 ASSESSMENT — COGNITIVE AND FUNCTIONAL STATUS - GENERAL
DAILY ACTIVITIY SCORE: 24
PATIENT BASELINE BEDBOUND: NO
MOBILITY SCORE: 24

## 2024-05-31 ASSESSMENT — COLUMBIA-SUICIDE SEVERITY RATING SCALE - C-SSRS
2. HAVE YOU ACTUALLY HAD ANY THOUGHTS OF KILLING YOURSELF?: NO
1. IN THE PAST MONTH, HAVE YOU WISHED YOU WERE DEAD OR WISHED YOU COULD GO TO SLEEP AND NOT WAKE UP?: NO
6. HAVE YOU EVER DONE ANYTHING, STARTED TO DO ANYTHING, OR PREPARED TO DO ANYTHING TO END YOUR LIFE?: NO

## 2024-05-31 ASSESSMENT — PAIN DESCRIPTION - LOCATION: LOCATION: ABDOMEN

## 2024-05-31 ASSESSMENT — PAIN - FUNCTIONAL ASSESSMENT: PAIN_FUNCTIONAL_ASSESSMENT: 0-10

## 2024-05-31 ASSESSMENT — PAIN SCALES - GENERAL: PAINLEVEL_OUTOF10: 3

## 2024-05-31 ASSESSMENT — PAIN DESCRIPTION - PAIN TYPE: TYPE: ACUTE PAIN

## 2024-05-31 ASSESSMENT — PAIN DESCRIPTION - DESCRIPTORS
DESCRIPTORS: ACHING
DESCRIPTORS: ACHING

## 2024-05-31 ASSESSMENT — PAIN DESCRIPTION - PROGRESSION: CLINICAL_PROGRESSION: NOT CHANGED

## 2024-05-31 NOTE — ED TRIAGE NOTES
Sent from [facility] for concern of kidney infection. Patient states she recently traveled out of state, having full body aches, fever, sweating, and just not feeling well. Patient was swabbed at [facility] for c19, flu, strept (all negative). Patient has been taking tylenol and advil around the clock and just not feeling any better. Denies CP or SOB

## 2024-06-01 LAB
ANION GAP SERPL CALC-SCNC: 13 MMOL/L (ref 10–20)
BUN SERPL-MCNC: 7 MG/DL (ref 6–23)
CALCIUM SERPL-MCNC: 8.3 MG/DL (ref 8.6–10.3)
CHLORIDE SERPL-SCNC: 105 MMOL/L (ref 98–107)
CO2 SERPL-SCNC: 25 MMOL/L (ref 21–32)
CREAT SERPL-MCNC: 0.73 MG/DL (ref 0.5–1.05)
EGFRCR SERPLBLD CKD-EPI 2021: >90 ML/MIN/1.73M*2
ERYTHROCYTE [DISTWIDTH] IN BLOOD BY AUTOMATED COUNT: 11.9 % (ref 11.5–14.5)
GLUCOSE SERPL-MCNC: 106 MG/DL (ref 74–99)
HCT VFR BLD AUTO: 32.3 % (ref 36–46)
HGB BLD-MCNC: 11.2 G/DL (ref 12–16)
HOLD SPECIMEN: NORMAL
MCH RBC QN AUTO: 31.5 PG (ref 26–34)
MCHC RBC AUTO-ENTMCNC: 34.7 G/DL (ref 32–36)
MCV RBC AUTO: 91 FL (ref 80–100)
NRBC BLD-RTO: 0 /100 WBCS (ref 0–0)
PLATELET # BLD AUTO: 217 X10*3/UL (ref 150–450)
POTASSIUM SERPL-SCNC: 3.9 MMOL/L (ref 3.5–5.3)
RBC # BLD AUTO: 3.55 X10*6/UL (ref 4–5.2)
SODIUM SERPL-SCNC: 139 MMOL/L (ref 136–145)
WBC # BLD AUTO: 10.4 X10*3/UL (ref 4.4–11.3)

## 2024-06-01 PROCEDURE — 2500000001 HC RX 250 WO HCPCS SELF ADMINISTERED DRUGS (ALT 637 FOR MEDICARE OP)

## 2024-06-01 PROCEDURE — 80048 BASIC METABOLIC PNL TOTAL CA: CPT

## 2024-06-01 PROCEDURE — G0378 HOSPITAL OBSERVATION PER HR: HCPCS

## 2024-06-01 PROCEDURE — 36415 COLL VENOUS BLD VENIPUNCTURE: CPT

## 2024-06-01 PROCEDURE — 2500000004 HC RX 250 GENERAL PHARMACY W/ HCPCS (ALT 636 FOR OP/ED)

## 2024-06-01 PROCEDURE — 96372 THER/PROPH/DIAG INJ SC/IM: CPT

## 2024-06-01 PROCEDURE — 85027 COMPLETE CBC AUTOMATED: CPT

## 2024-06-01 PROCEDURE — 96376 TX/PRO/DX INJ SAME DRUG ADON: CPT

## 2024-06-01 RX ORDER — SIMETHICONE 80 MG
120 TABLET,CHEWABLE ORAL 4 TIMES DAILY PRN
Status: DISCONTINUED | OUTPATIENT
Start: 2024-06-01 | End: 2024-06-02 | Stop reason: HOSPADM

## 2024-06-01 RX ORDER — ACETAMINOPHEN 500 MG
5 TABLET ORAL ONCE
Status: COMPLETED | OUTPATIENT
Start: 2024-06-01 | End: 2024-06-01

## 2024-06-01 RX ORDER — ESCITALOPRAM OXALATE 10 MG/1
10 TABLET ORAL DAILY
Status: DISCONTINUED | OUTPATIENT
Start: 2024-06-01 | End: 2024-06-02 | Stop reason: HOSPADM

## 2024-06-01 RX ORDER — KETOROLAC TROMETHAMINE 30 MG/ML
15 INJECTION, SOLUTION INTRAMUSCULAR; INTRAVENOUS EVERY 6 HOURS PRN
Status: DISCONTINUED | OUTPATIENT
Start: 2024-06-01 | End: 2024-06-02 | Stop reason: HOSPADM

## 2024-06-01 RX ORDER — ACETAMINOPHEN 325 MG/1
650 TABLET ORAL EVERY 4 HOURS PRN
Status: DISCONTINUED | OUTPATIENT
Start: 2024-06-01 | End: 2024-06-02 | Stop reason: HOSPADM

## 2024-06-01 RX ADMIN — ACETAMINOPHEN 650 MG: 325 TABLET ORAL at 13:11

## 2024-06-01 RX ADMIN — SODIUM CHLORIDE 100 ML/HR: 9 INJECTION, SOLUTION INTRAVENOUS at 17:29

## 2024-06-01 RX ADMIN — ESCITALOPRAM OXALATE 10 MG: 10 TABLET ORAL at 08:29

## 2024-06-01 RX ADMIN — CEFTRIAXONE SODIUM 1 G: 1 INJECTION, SOLUTION INTRAVENOUS at 18:28

## 2024-06-01 RX ADMIN — SIMETHICONE 120 MG: 80 TABLET, CHEWABLE ORAL at 00:55

## 2024-06-01 RX ADMIN — SODIUM CHLORIDE 100 ML/HR: 9 INJECTION, SOLUTION INTRAVENOUS at 00:26

## 2024-06-01 RX ADMIN — ACETAMINOPHEN 650 MG: 325 TABLET ORAL at 21:09

## 2024-06-01 RX ADMIN — HEPARIN SODIUM 5000 UNITS: 5000 INJECTION INTRAVENOUS; SUBCUTANEOUS at 00:26

## 2024-06-01 RX ADMIN — ACETAMINOPHEN 650 MG: 325 TABLET ORAL at 04:25

## 2024-06-01 RX ADMIN — KETOROLAC TROMETHAMINE 15 MG: 30 INJECTION, SOLUTION INTRAMUSCULAR at 04:16

## 2024-06-01 RX ADMIN — KETOROLAC TROMETHAMINE 15 MG: 30 INJECTION, SOLUTION INTRAMUSCULAR at 17:28

## 2024-06-01 RX ADMIN — Medication 5 MG: at 00:41

## 2024-06-01 RX ADMIN — KETOROLAC TROMETHAMINE 15 MG: 30 INJECTION, SOLUTION INTRAMUSCULAR at 23:33

## 2024-06-01 RX ADMIN — ACETAMINOPHEN 650 MG: 325 TABLET ORAL at 08:34

## 2024-06-01 RX ADMIN — HEPARIN SODIUM 5000 UNITS: 5000 INJECTION INTRAVENOUS; SUBCUTANEOUS at 08:29

## 2024-06-01 RX ADMIN — HEPARIN SODIUM 5000 UNITS: 5000 INJECTION INTRAVENOUS; SUBCUTANEOUS at 13:11

## 2024-06-01 RX ADMIN — KETOROLAC TROMETHAMINE 15 MG: 30 INJECTION, SOLUTION INTRAMUSCULAR at 11:05

## 2024-06-01 RX ADMIN — SODIUM CHLORIDE 100 ML/HR: 9 INJECTION, SOLUTION INTRAVENOUS at 08:35

## 2024-06-01 RX ADMIN — HEPARIN SODIUM 5000 UNITS: 5000 INJECTION INTRAVENOUS; SUBCUTANEOUS at 21:09

## 2024-06-01 SDOH — SOCIAL STABILITY: SOCIAL INSECURITY: HAS ANYONE EVER THREATENED TO HURT YOUR FAMILY OR YOUR PETS?: NO

## 2024-06-01 SDOH — SOCIAL STABILITY: SOCIAL INSECURITY: ABUSE: ADULT

## 2024-06-01 SDOH — SOCIAL STABILITY: SOCIAL INSECURITY: WERE YOU ABLE TO COMPLETE ALL THE BEHAVIORAL HEALTH SCREENINGS?: YES

## 2024-06-01 SDOH — SOCIAL STABILITY: SOCIAL INSECURITY: DO YOU FEEL ANYONE HAS EXPLOITED OR TAKEN ADVANTAGE OF YOU FINANCIALLY OR OF YOUR PERSONAL PROPERTY?: NO

## 2024-06-01 SDOH — SOCIAL STABILITY: SOCIAL INSECURITY: DO YOU FEEL UNSAFE GOING BACK TO THE PLACE WHERE YOU ARE LIVING?: NO

## 2024-06-01 SDOH — SOCIAL STABILITY: SOCIAL INSECURITY: ARE YOU OR HAVE YOU BEEN THREATENED OR ABUSED PHYSICALLY, EMOTIONALLY, OR SEXUALLY BY ANYONE?: NO

## 2024-06-01 SDOH — SOCIAL STABILITY: SOCIAL INSECURITY: HAVE YOU HAD THOUGHTS OF HARMING ANYONE ELSE?: NO

## 2024-06-01 SDOH — SOCIAL STABILITY: SOCIAL INSECURITY: DOES ANYONE TRY TO KEEP YOU FROM HAVING/CONTACTING OTHER FRIENDS OR DOING THINGS OUTSIDE YOUR HOME?: NO

## 2024-06-01 SDOH — SOCIAL STABILITY: SOCIAL INSECURITY: ARE THERE ANY APPARENT SIGNS OF INJURIES/BEHAVIORS THAT COULD BE RELATED TO ABUSE/NEGLECT?: NO

## 2024-06-01 ASSESSMENT — PAIN DESCRIPTION - LOCATION
LOCATION: GENERALIZED
LOCATION: ABDOMEN

## 2024-06-01 ASSESSMENT — PAIN - FUNCTIONAL ASSESSMENT
PAIN_FUNCTIONAL_ASSESSMENT: 0-10

## 2024-06-01 ASSESSMENT — PAIN SCALES - GENERAL
PAINLEVEL_OUTOF10: 4
PAINLEVEL_OUTOF10: 5 - MODERATE PAIN
PAINLEVEL_OUTOF10: 4
PAINLEVEL_OUTOF10: 4
PAINLEVEL_OUTOF10: 3
PAINLEVEL_OUTOF10: 2
PAINLEVEL_OUTOF10: 3
PAINLEVEL_OUTOF10: 5 - MODERATE PAIN
PAINLEVEL_OUTOF10: 1
PAINLEVEL_OUTOF10: 4

## 2024-06-01 ASSESSMENT — PATIENT HEALTH QUESTIONNAIRE - PHQ9
2. FEELING DOWN, DEPRESSED OR HOPELESS: SEVERAL DAYS
1. LITTLE INTEREST OR PLEASURE IN DOING THINGS: SEVERAL DAYS
SUM OF ALL RESPONSES TO PHQ9 QUESTIONS 1 & 2: 2

## 2024-06-01 ASSESSMENT — LIFESTYLE VARIABLES
AUDIT-C TOTAL SCORE: 1
SKIP TO QUESTIONS 9-10: 1
HOW OFTEN DO YOU HAVE 6 OR MORE DRINKS ON ONE OCCASION: NEVER
HOW OFTEN DO YOU HAVE A DRINK CONTAINING ALCOHOL: MONTHLY OR LESS
HOW MANY STANDARD DRINKS CONTAINING ALCOHOL DO YOU HAVE ON A TYPICAL DAY: 1 OR 2
AUDIT-C TOTAL SCORE: 1

## 2024-06-01 NOTE — CARE PLAN
The patient's goals for the shift include comfort and sleep.    The clinical goals for the shift include comfort and safety.    Over the shift, the patient did not make progress toward the following goals. Barriers to progression include recurrent pain and fever. Recommendations to address these barriers include prn pain meds and acetaminophen for fever.

## 2024-06-01 NOTE — PROGRESS NOTES
06/01/24 1421   Discharge Planning   Living Arrangements Spouse/significant other   Support Systems Spouse/significant other   Assistance Needed Independent, patient does drive   Type of Residence Private residence   Home or Post Acute Services None   Patient expects to be discharged to: Home     Met with patient and patients  at bedside, introduced self and role on care transitions team. Admission assessment completed with patient. Address, insurance and contact information verified. PCP is Dr. Hudson. Patient has declined any home going needs. Patient plans to return home at discharge.

## 2024-06-01 NOTE — H&P
History Of Present Illness  Lien Lee is a 48 y.o. female with past medical history of anxiety, chronic idiopathic constipation, hysterectomy, who presented to [facility] ED today from [facility] with left sided flank pain for a week. States she also has body aches, fever, chills. She tested negative at the [facility] for Covid-19/influenza/strep. States she has been taking Tylenol and Advil around the clock and not feeling any better. States she also has abdominal bloating since Monday that is new. States she did recently travel out of [facility] on a plane. Denies chest pain, shortness of breath. Denies urinary symptoms. Denies hx of DVT or PE. Denies nausea, vomiting, diarrhea, or constipation.     ED Course: HDS, afebrile, /84, , RR 19, 97% RA. EKG unavailable for my review. Labs: Overall unremarkable. Sodium 135. Lactate 0.6. Lipase 14. UA: colorless, specific gravity 1.002, 1+blood, +leukocytes, WBC>50. Urine and blood cultures sent. Ceftriaxone and IVF Patient will be admitted under the care of Dr. Baker who will continue to follow. I was asked to H&P and place initial admission orders.     Past Medical History  Past Medical History:   Diagnosis Date    Elevated BP without diagnosis of hypertension 10/01/2023    Other conditions influencing health status 06/15/2022    History of cough    Personal history of other diseases of the female genital tract 10/13/2022    History of vaginitis    Personal history of other diseases of the respiratory system 06/28/2022    History of sinusitis       Surgical History  Past Surgical History:   Procedure Laterality Date    OTHER SURGICAL HISTORY  02/25/2022    [place] tooth extraction    OTHER SURGICAL HISTORY  02/25/2022    Tonsillectomy    OTHER SURGICAL HISTORY  02/25/2022    Bunionectomy    OTHER SURGICAL HISTORY  02/25/2022    Rhinoplasty    OTHER SURGICAL HISTORY  02/25/2022    Nasal septoplasty    OTHER SURGICAL HISTORY  02/25/2022    Excision of  basal cell carcinoma    OTHER SURGICAL HISTORY  02/25/2022    Hysterectomy    OTHER SURGICAL HISTORY  02/25/2022    Loop electrosurgical excision procedure        Social History  Denies smoking. Occasional alcohol use. Uses cannabis edibles occasionally. Lives with . Ambulates independently.     Family History  Family History   Problem Relation Name Age of Onset    COPD Mother      Hyperlipidemia Mother      Stroke Father      Glaucoma Father      Lung cancer Father      Stroke Sibling          Allergies  Latex, Nitrofurantoin monohyd/m-cryst, Penicillins, and Sulfamethoxazole-trimethoprim    Review of Systems   10 point ROS reviewed and otherwise negative.  Physical Exam  Constitutional:       Appearance: Normal appearance.   HENT:      Head: Normocephalic and atraumatic.      Nose: Nose normal.      Mouth/Throat:      Mouth: Mucous membranes are moist.      Pharynx: Oropharynx is clear.   Eyes:      Extraocular Movements: Extraocular movements intact.      Conjunctiva/sclera: Conjunctivae normal.      Pupils: Pupils are equal, round, and reactive to light.   Cardiovascular:      Rate and Rhythm: Normal rate and regular rhythm.      Pulses: Normal pulses.      Heart sounds: Normal heart sounds.   Pulmonary:      Effort: Pulmonary effort is normal.      Breath sounds: Normal breath sounds.   Abdominal:      General: Abdomen is flat. Bowel sounds are normal.      Palpations: Abdomen is soft.      Tenderness: There is abdominal tenderness.      Comments: Mild tenderness to upper quadrants   Musculoskeletal:         General: Normal range of motion.      Cervical back: Normal range of motion and neck supple.   Skin:     General: Skin is warm and dry.      Capillary Refill: Capillary refill takes less than 2 seconds.   Neurological:      General: No focal deficit present.      Mental Status: She is alert and oriented to person, place, and time.   Psychiatric:         Mood and Affect: Mood normal.         Behavior:  "Behavior normal.         Thought Content: Thought content normal.         Judgment: Judgment normal.          Last Recorded Vitals  Blood pressure 152/80, pulse (!) 102, temperature 37.1 °C (98.8 °F), temperature source Temporal, resp. rate 16, height 1.702 m (5' 7\"), weight 63.5 kg (140 lb), SpO2 98%.    Relevant Results  Results for orders placed or performed during the hospital encounter of 05/31/24 (from the past 24 hour(s))   Urinalysis with Reflex Culture and Microscopic   Result Value Ref Range    Color, Urine Colorless (N) Light-Yellow, Yellow, Dark-Yellow    Appearance, Urine Clear Clear    Specific Gravity, Urine 1.002 (N) 1.005 - 1.035    pH, Urine 6.5 5.0, 5.5, 6.0, 6.5, 7.0, 7.5, 8.0    Protein, Urine NEGATIVE NEGATIVE, 10 (TRACE), 20 (TRACE) mg/dL    Glucose, Urine Normal Normal mg/dL    Blood, Urine 0.1 (1+) (A) NEGATIVE    Ketones, Urine NEGATIVE NEGATIVE mg/dL    Bilirubin, Urine NEGATIVE NEGATIVE    Urobilinogen, Urine Normal Normal mg/dL    Nitrite, Urine NEGATIVE NEGATIVE    Leukocyte Esterase, Urine 250 Carol/µL (A) NEGATIVE   Microscopic Only, Urine   Result Value Ref Range    WBC, Urine >50 (A) 1-5, NONE /HPF    RBC, Urine 1-2 NONE, 1-2, 3-5 /HPF    Squamous Epithelial Cells, Urine 1-9 (SPARSE) Reference range not established. /HPF   CBC and Auto Differential   Result Value Ref Range    WBC 10.6 4.4 - 11.3 x10*3/uL    nRBC 0.0 0.0 - 0.0 /100 WBCs    RBC 4.36 4.00 - 5.20 x10*6/uL    Hemoglobin 13.5 12.0 - 16.0 g/dL    Hematocrit 39.3 36.0 - 46.0 %    MCV 90 80 - 100 fL    MCH 31.0 26.0 - 34.0 pg    MCHC 34.4 32.0 - 36.0 g/dL    RDW 11.8 11.5 - 14.5 %    Platelets 264 150 - 450 x10*3/uL    Neutrophils % 71.9 40.0 - 80.0 %    Immature Granulocytes %, Automated 0.6 0.0 - 0.9 %    Lymphocytes % 14.6 13.0 - 44.0 %    Monocytes % 12.0 2.0 - 10.0 %    Eosinophils % 0.6 0.0 - 6.0 %    Basophils % 0.3 0.0 - 2.0 %    Neutrophils Absolute 7.65 1.20 - 7.70 x10*3/uL    Immature Granulocytes Absolute, " Automated 0.06 0.00 - 0.70 x10*3/uL    Lymphocytes Absolute 1.55 1.20 - 4.80 x10*3/uL    Monocytes Absolute 1.28 (H) 0.10 - 1.00 x10*3/uL    Eosinophils Absolute 0.06 0.00 - 0.70 x10*3/uL    Basophils Absolute 0.03 0.00 - 0.10 x10*3/uL   Lipase   Result Value Ref Range    Lipase 14 9 - 82 U/L   Comprehensive metabolic panel   Result Value Ref Range    Glucose 87 74 - 99 mg/dL    Sodium 135 (L) 136 - 145 mmol/L    Potassium 3.8 3.5 - 5.3 mmol/L    Chloride 99 98 - 107 mmol/L    Bicarbonate 27 21 - 32 mmol/L    Anion Gap 13 10 - 20 mmol/L    Urea Nitrogen 8 6 - 23 mg/dL    Creatinine 0.84 0.50 - 1.05 mg/dL    eGFR 85 >60 mL/min/1.73m*2    Calcium 9.4 8.6 - 10.3 mg/dL    Albumin 4.3 3.4 - 5.0 g/dL    Alkaline Phosphatase 68 33 - 110 U/L    Total Protein 8.1 6.4 - 8.2 g/dL    AST 23 9 - 39 U/L    Bilirubin, Total 0.5 0.0 - 1.2 mg/dL    ALT 24 7 - 45 U/L   Human Chorionic Gonadotropin, Serum Quantitative   Result Value Ref Range    HCG, Beta-Quantitative 5 (H) <5 mIU/mL   Lactate   Result Value Ref Range    Lactate 0.6 0.4 - 2.0 mmol/L       Assessment/Plan   Principal Problem:    Acute pyelonephritis    48 year old female patient with past medical history of anxiety, chronic idiopathic constipation, hysterectomy, who presented to [facility] ED today from [facility] with left sided flank pain for a week. States she also has body aches, fever, chills. She tested negative at the [facility] for Covid-19/influenza/strep. CT abd/pelvis and CXR ordered. Continue antibiotics and IVF. Patient to be admitted for further medical management.    #Suspect Pyelonephritis   #Suspect UTI  #Fever  #Left flank pain  Admit to OBS/RMF to Dr. Baker  CT abdomen/pelvis ordered to r/o kidney stones  Continue ceftriaxone  1L NS given in ED. Continue NS @ 75 ml/hr x 1L  Follow urine and blood cultures  Repeat labs in AM    Chronic conditions  #Anxiety, chronic idiopathic constipation  Continue home meds as appropriate when nursing completes  home med rec.  Full code    #DVT prophylaxis  Heparin SQ  SCD's    I spent 35 minutes in the professional and overall care of this patient.    Dianne Arnold, APRN-CNP

## 2024-06-01 NOTE — CARE PLAN
The patient's goals for the shift include  pain control and sleep.    The clinical goals for the shift include  comfort and safety.    Over the shift, the patient did not make progress toward the following goals. Barriers to progression include recurrent pain. Recommendations to address these barriers include pain medications as needed.

## 2024-06-01 NOTE — CARE PLAN
Problem: Pain  Goal: My pain/discomfort is manageable  Outcome: Progressing     Problem: Safety  Goal: Patient will be injury free during hospitalization  Outcome: Progressing  Goal: I will remain free of falls  Outcome: Progressing     Problem: Daily Care  Goal: Daily care needs are met  Outcome: Progressing     Problem: Psychosocial Needs  Goal: Demonstrates ability to cope with hospitalization/illness  Outcome: Progressing  Goal: Collaborate with me, my family, and caregiver to identify my specific goals  Outcome: Progressing     Problem: Discharge Barriers  Goal: My discharge needs are met  Outcome: Progressing     Problem: Skin  Goal: Decreased wound size/increased tissue granulation at next dressing change  Outcome: Progressing  Goal: Participates in plan/prevention/treatment measures  Outcome: Progressing  Goal: Prevent/manage excess moisture  Outcome: Progressing  Goal: Prevent/minimize sheer/friction injuries  Outcome: Progressing  Goal: Promote/optimize nutrition  Outcome: Progressing  Goal: Promote skin healing  Outcome: Progressing     Problem: Pain  Goal: Takes deep breaths with improved pain control throughout the shift  Outcome: Progressing  Goal: Turns in bed with improved pain control throughout the shift  Outcome: Progressing  Goal: Walks with improved pain control throughout the shift  Outcome: Progressing  Goal: Performs ADL's with improved pain control throughout shift  Outcome: Progressing  Goal: Participates in PT with improved pain control throughout the shift  Outcome: Progressing  Goal: Free from opioid side effects throughout the shift  Outcome: Progressing  Goal: Free from acute confusion related to pain meds throughout the shift  Outcome: Progressing     Problem: Pain - Adult  Goal: Verbalizes/displays adequate comfort level or baseline comfort level  Outcome: Progressing     Problem: Safety - Adult  Goal: Free from fall injury  Outcome: Progressing     Problem: Discharge  Planning  Goal: Discharge to home or other facility with appropriate resources  Outcome: Progressing     Problem: Chronic Conditions and Co-morbidities  Goal: Patient's chronic conditions and co-morbidity symptoms are monitored and maintained or improved  Outcome: Progressing   The patient's goals for the shift include comfort and sleep    The clinical goals for the shift include pain control

## 2024-06-01 NOTE — ED PROVIDER NOTES
HPI   Chief Complaint   Patient presents with    Abdominal Pain     Sent from [facility] for concern of kidney infection. Patient states she recently traveled out of state, having full body aches, fever, sweating, and just not feeling well. Patient was swabbed at [facility] for c19, flu, strept (all negative). Patient has been taking tylenol and advil around the clock and just not feeling any better. Denies CP or SOB       Presents with generally feeling unwell for the past week.  Notes body aches, fevers, chills, and left-sided flank pain.  States that she was seen at urgent care, whose urine was concern for possible urinary tract infection.  She was sent to the emergency department for further evaluation.  No history of kidney stones.  Does have a prior history of hysterectomy.  No history of DVT or PE.  Did recently travel.  Denies chest pain shortness of breath.      History provided by:  Patient   used: No                        [place] Coma Scale Score: 15                     Patient History   Past Medical History:   Diagnosis Date    Elevated BP without diagnosis of hypertension 10/01/2023    Other conditions influencing health status 06/15/2022    History of cough    Personal history of other diseases of the female genital tract 10/13/2022    History of vaginitis    Personal history of other diseases of the respiratory system 06/28/2022    History of sinusitis     Past Surgical History:   Procedure Laterality Date    OTHER SURGICAL HISTORY  02/25/2022    [place] tooth extraction    OTHER SURGICAL HISTORY  02/25/2022    Tonsillectomy    OTHER SURGICAL HISTORY  02/25/2022    Bunionectomy    OTHER SURGICAL HISTORY  02/25/2022    Rhinoplasty    OTHER SURGICAL HISTORY  02/25/2022    Nasal septoplasty    OTHER SURGICAL HISTORY  02/25/2022    Excision of basal cell carcinoma    OTHER SURGICAL HISTORY  02/25/2022    Hysterectomy    OTHER SURGICAL HISTORY  02/25/2022    Loop electrosurgical  excision procedure     Family History   Problem Relation Name Age of Onset    COPD Mother      Hyperlipidemia Mother      Stroke Father      Glaucoma Father      Lung cancer Father      Stroke Sibling       Social History     Tobacco Use    Smoking status: Never     Passive exposure: Past    Smokeless tobacco: Never   Substance Use Topics    Alcohol use: Yes     Comment: social    Drug use: Yes     Types: Marijuana       Physical Exam   ED Triage Vitals [05/31/24 1703]   Temperature Heart Rate Respirations BP   37.1 °C (98.8 °F) (!) 101 19 155/84      Pulse Ox Temp Source Heart Rate Source Patient Position   97 % Temporal Monitor Sitting      BP Location FiO2 (%)     Right arm --       Physical Exam  Vitals and nursing note reviewed.   HENT:      Head: Atraumatic.      Mouth/Throat:      Mouth: Mucous membranes are moist.   Eyes:      Conjunctiva/sclera: Conjunctivae normal.   Cardiovascular:      Rate and Rhythm: Normal rate and regular rhythm.   Pulmonary:      Effort: Pulmonary effort is normal.   Abdominal:      Palpations: Abdomen is soft.      Tenderness: There is no abdominal tenderness. There is no right CVA tenderness or left CVA tenderness.   Musculoskeletal:         General: No deformity.      Cervical back: Normal range of motion.   Skin:     General: Skin is warm and dry.      Comments: No rash in dermatomal distribution on the flank   Neurological:      Mental Status: She is alert.      Comments: Moving all extremities         ED Course & MDM   Diagnoses as of 05/31/24 2032   Pyelonephritis       Medical Decision Making  Clinical picture concerning for pyelonephritis.  Will start on IV antibiotics and admit for further management.  Has 1/4 SIRS criteria; not septic.  No history of kidney stones or hematuria; will defer imaging at this time.  Will escalate care to hospitalization for further management.    Amount and/or Complexity of Data Reviewed  Labs: ordered.  Discussion of management or test  interpretation with external provider(s): Spoke with the admitting medicine physician    Risk  Decision regarding hospitalization.        Procedure  Procedures     Vance Rodrigues MD  05/31/24 2042

## 2024-06-01 NOTE — PROGRESS NOTES
Lien Lee is a 48 y.o. female on day 0 of admission presenting with Acute pyelonephritis.      Subjective   48 y.o. female with past medical history of anxiety, chronic idiopathic constipation, hysterectomy, who presented to [facility] ED today from [facility] with left sided flank pain for a week. States she also has body aches, fever, chills. She tested negative at the [facility] for Covid-19/influenza/strep. States she has been taking Tylenol and Advil around the clock and not feeling any better. States she also has abdominal bloating since Monday that is new. States she did recently travel out of state on a plane. Denies chest pain, shortness of breath. Denies urinary symptoms. Denies hx of DVT or PE. Denies nausea, vomiting, diarrhea, or constipation.        Objective     Last Recorded Vitals  /76 (BP Location: Left arm, Patient Position: Lying)   Pulse 87   Temp 36.4 °C (97.5 °F) (Temporal)   Resp 18   Wt 63.5 kg (140 lb)   SpO2 97%   Intake/Output last 3 Shifts:    Intake/Output Summary (Last 24 hours) at 6/1/2024 1155  Last data filed at 6/1/2024 0817  Gross per 24 hour   Intake 785 ml   Output --   Net 785 ml       Admission Weight  Weight: 63.5 kg (140 lb) (05/31/24 1703)    Daily Weight  05/31/24 : 63.5 kg (140 lb)    Image Results  CT abdomen pelvis wo IV contrast  Narrative: Interpreted By:  Raman Moody,   STUDY:  CT ABDOMEN PELVIS WO IV CONTRAST;  5/31/2024 10:05 pm      INDICATION:  Signs/Symptoms:L flank pain.      COMPARISON:  None.      ACCESSION NUMBER(S):  [id number]      ORDERING CLINICIAN:  PRIETO ELKINS      TECHNIQUE:  Axial noncontrast CT images of the abdomen and pelvis with coronal  and sagittal reconstructed images.      FINDINGS:      LOWER CHEST: Bibasilar atelectasis.      ABDOMEN:  Lack of intravenous contrast limits evaluation of vessels and solid  organs. LIVER: Within normal limits.  BILE DUCTS: Normal caliber.  GALLBLADDER: No calcified gallstones. No  wall thickening.  PANCREAS: Within normal limits.  SPLEEN: Within normal limits.  ADRENALS: Within normal limits.      KIDNEYS, URETERS, URINARY BLADDER:  Kidneys are symmetric in size  without evidence for calculi, hydronephrosis or hydroureter. There is  mild left urothelial thickening and perinephric stranding. No bladder  calculi or wall thickening.      VESSELS:  Calcific atherosclerosis of the aortoiliac vessels. No  aortic aneurysm. RETROPERITONEUM: No pathologically enlarged lymph  nodes.      PELVIS:      REPRODUCTIVE ORGANS: Uterus is surgically absent. No adnexal mass.      BOWEL: Stomach is partially distended. Visualized loops of bowel are  without evidence for obstruction. Moderate stool burden. Scattered  colonic diverticula without evidence for acute diverticulitis. Normal  appendix. PERITONEUM: No ascites or free air, no fluid collection.  ABDOMINAL WALL: Within normal limits.  BONES: No acute osseous abnormality.      Impression: No evidence for obstructive uropathy. There is mild left urothelial  thickening and perinephric inflammatory stranding. These findings  raise concern for developing pyelonephritis. Correlate with  urinalysis. Sequela of a recently passed calculus is considered less  likely in the differential.      Additional findings as described above.      MACRO:  None      Signed by: Raman Moody 5/31/2024 11:19 PM  Dictation workstation:   [id number]  XR chest 1 view  Narrative: Interpreted By:  Mak Garcia,   STUDY:  XR CHEST 1 VIEW;  5/31/2024 9:58 pm      INDICATION:  Signs/Symptoms:fever.      COMPARISON:  None.      ACCESSION NUMBER(S):  [id number]      ORDERING CLINICIAN:  PRIETO ELKINS      FINDINGS:                  CARDIOMEDIASTINAL SILHOUETTE:  Cardiomediastinal silhouette is normal in size and configuration.      LUNGS:  Lungs are clear. There is no consolidation. There is no effusion.  There is no edema.      ABDOMEN:  No remarkable upper abdominal  findings.      BONES:  No acute osseous changes.      Impression: 1.  No evidence of acute cardiopulmonary process.              MACRO:  None      Signed by: Mak Garcia 5/31/2024 10:09 PM  Dictation workstation:   [id number]    Results from last 7 days   Lab Units 06/01/24  0644   WBC AUTO x10*3/uL 10.4   HEMOGLOBIN g/dL 11.2*   HEMATOCRIT % 32.3*   PLATELETS AUTO x10*3/uL 217      Results from last 7 days   Lab Units 06/01/24  0644 05/31/24  1939   SODIUM mmol/L 139 135*   POTASSIUM mmol/L 3.9 3.8   CHLORIDE mmol/L 105 99   CO2 mmol/L 25 27   BUN mg/dL 7 8   CREATININE mg/dL 0.73 0.84   CALCIUM mg/dL 8.3* 9.4   PROTEIN TOTAL g/dL  --  8.1   BILIRUBIN TOTAL mg/dL  --  0.5   ALK PHOS U/L  --  68   ALT U/L  --  24   AST U/L  --  23   GLUCOSE mg/dL 106* 87      Pain no fever no shortness of breath nausea the remainder of the 10 system review is negative at this time    Physical Exam  Awake and alert lungs are clear anteriorly no rales or wheezes heart has a regular rate and rhythm no murmurs gallops or rubs abdomen is soft is not distended or firm skin is warm and dry no clubbing or peripheral cyanosis  Relevant Results               Assessment/Plan                  Principal Problem:    Acute pyelonephritis      So far with the left flank pain acute pyelonephritis   Continue with Rocephin IV and continue with normal saline fluids repeat the CBC and the renal panel follow-up with the urine and blood cultures when to repeat the labs as a need to    I am going to continue with heparin for DVT prophylaxis monitor for any fever fluid hydration continue with her home medication for her chronic conditions and continue with all other present care and therapy thank you            Armando Baker DO

## 2024-06-02 VITALS
OXYGEN SATURATION: 96 % | RESPIRATION RATE: 18 BRPM | HEIGHT: 67 IN | HEART RATE: 75 BPM | SYSTOLIC BLOOD PRESSURE: 148 MMHG | TEMPERATURE: 98.4 F | DIASTOLIC BLOOD PRESSURE: 82 MMHG | WEIGHT: 140 LBS | BODY MASS INDEX: 21.97 KG/M2

## 2024-06-02 PROCEDURE — 2500000001 HC RX 250 WO HCPCS SELF ADMINISTERED DRUGS (ALT 637 FOR MEDICARE OP)

## 2024-06-02 PROCEDURE — 2500000004 HC RX 250 GENERAL PHARMACY W/ HCPCS (ALT 636 FOR OP/ED)

## 2024-06-02 PROCEDURE — G0378 HOSPITAL OBSERVATION PER HR: HCPCS

## 2024-06-02 PROCEDURE — 96372 THER/PROPH/DIAG INJ SC/IM: CPT

## 2024-06-02 PROCEDURE — 2500000004 HC RX 250 GENERAL PHARMACY W/ HCPCS (ALT 636 FOR OP/ED): Performed by: INTERNAL MEDICINE

## 2024-06-02 PROCEDURE — 96376 TX/PRO/DX INJ SAME DRUG ADON: CPT

## 2024-06-02 RX ORDER — CEFTRIAXONE 1 G/50ML
1 INJECTION, SOLUTION INTRAVENOUS ONCE
Status: COMPLETED | OUTPATIENT
Start: 2024-06-02 | End: 2024-06-02

## 2024-06-02 RX ORDER — CEPHALEXIN 250 MG/1
250 CAPSULE ORAL 4 TIMES DAILY
Qty: 40 CAPSULE | Refills: 0 | Status: SHIPPED | OUTPATIENT
Start: 2024-06-02 | End: 2024-06-12

## 2024-06-02 RX ORDER — FLUCONAZOLE 150 MG/1
150 TABLET ORAL DAILY
Qty: 3 TABLET | Refills: 0 | Status: SHIPPED | OUTPATIENT
Start: 2024-06-02

## 2024-06-02 RX ADMIN — HEPARIN SODIUM 5000 UNITS: 5000 INJECTION INTRAVENOUS; SUBCUTANEOUS at 05:28

## 2024-06-02 RX ADMIN — KETOROLAC TROMETHAMINE 15 MG: 30 INJECTION, SOLUTION INTRAMUSCULAR at 05:28

## 2024-06-02 RX ADMIN — ACETAMINOPHEN 650 MG: 325 TABLET ORAL at 01:17

## 2024-06-02 RX ADMIN — ACETAMINOPHEN 650 MG: 325 TABLET ORAL at 11:58

## 2024-06-02 RX ADMIN — ACETAMINOPHEN 650 MG: 325 TABLET ORAL at 05:26

## 2024-06-02 RX ADMIN — KETOROLAC TROMETHAMINE 15 MG: 30 INJECTION, SOLUTION INTRAMUSCULAR at 11:58

## 2024-06-02 RX ADMIN — ESCITALOPRAM OXALATE 10 MG: 10 TABLET ORAL at 09:54

## 2024-06-02 RX ADMIN — CEFTRIAXONE SODIUM 1 G: 1 INJECTION, SOLUTION INTRAVENOUS at 12:00

## 2024-06-02 ASSESSMENT — PAIN SCALES - GENERAL
PAINLEVEL_OUTOF10: 6
PAINLEVEL_OUTOF10: 4
PAINLEVEL_OUTOF10: 0 - NO PAIN
PAINLEVEL_OUTOF10: 2
PAINLEVEL_OUTOF10: 2
PAINLEVEL_OUTOF10: 0 - NO PAIN
PAINLEVEL_OUTOF10: 0 - NO PAIN

## 2024-06-02 ASSESSMENT — PAIN - FUNCTIONAL ASSESSMENT
PAIN_FUNCTIONAL_ASSESSMENT: 0-10

## 2024-06-02 ASSESSMENT — PAIN DESCRIPTION - LOCATION: LOCATION: HEAD

## 2024-06-02 NOTE — NURSING NOTE
Pt discharged home at this time. No pain or discomfort expressed during time of discharge. AVS reviewed at bedside. Family present at bedside during discharge. Pt to be transported home by family.

## 2024-06-02 NOTE — CARE PLAN
The patient's goals for the shift include comfort and sleep    The clinical goals for the shift include ensuring that the patient gets pain relief and is comfortable

## 2024-06-02 NOTE — DISCHARGE SUMMARY
Discharge Diagnosis  Acute pyelonephritis    Issues Requiring Follow-Up      Discharge Meds     Your medication list        START taking these medications        Instructions Last Dose Given Next Dose Due   cephalexin 250 mg capsule  Commonly known as: Keflex      Take 1 capsule (250 mg) by mouth 4 times a day for 10 days.       fluconazole 150 mg tablet  Commonly known as: Diflucan      Take 1 tablet (150 mg) by mouth once daily.              CONTINUE taking these medications        Instructions Last Dose Given Next Dose Due   BENEFIBER CLEAR SF (DEXTRIN) ORAL           clindamycin 1 % lotion  Commonly known as: Cleocin T           escitalopram 10 mg tablet  Commonly known as: Lexapro      Take 1 tablet (10 mg) by mouth once daily.       FLAXSEED ORAL           hyoscyamine 0.125 mg disintegrating tablet  Commonly known as: Anaspaz           Imvexxy Maintenance Pack 4 mcg insert  Generic drug: estradioL      Insert 4 mcg into the vagina 2 times a week.       multivitamin tablet           NUTRITIONAL SUPPLEMENTS ORAL           valACYclovir 1 gram tablet  Commonly known as: Valtrex           vit D3-vit K-berberine-hops 821-625- [id number]-mcg-mg-mg tablet                     Where to Get Your Medications        These medications were sent to [facility]/pharmacy #[id number] [facility] [address] AT [place] [address]      Phone: [phone]   cephalexin 250 mg capsule  fluconazole 150 mg tablet         Test Results Pending At Discharge  Pending Labs       Order Current Status    Blood Culture Preliminary result    Blood Culture Preliminary result    Urine Culture Preliminary result            Hospital Course   48 y.o. female with past medical history of anxiety, chronic idiopathic constipation, hysterectomy, who [facility] with left sided flank pain for a week. States she also has body aches, fever, chills. [facility] for Covid-19/influenza/strep. States she has been taking Tylenol and Advil around the clock and not feeling any better. States she also has abdominal bloating since Monday that is new. States she did recently travel out of state on a plane. Denies chest pain, shortness of breath. Denies urinary symptoms. Denies hx of DVT or PE. Denies nausea, vomiting, diarrhea, or constipation.   So far her blood cultures were negative her urine culture showed 20 80,000 gram-negative bacilli she was much better on today.  Went ahead and discharged her and treated her still for an acute pyelonephritis placed her on Keflex 250 4 times daily x 10 days.  She will take Advil or Aleve for pain explained both of that to her and her  if she developed a yeast infection from the Keflex and also prescribe Diflucan once a day for 3 days at the end of the course told her follow-up with her primary care physician and continue with all other medications that she has discharged the management greater than 30 minutes total time thank you    Pertinent Physical Exam At Time of Discharge  Physical Exam    Outpatient Follow-Up  Future Appointments   Date Time Provider Department Center   3/19/2025 10:30 AM Caleb Hudson MD [id number] Manuelito Baker DO

## 2024-06-03 ENCOUNTER — PATIENT OUTREACH (OUTPATIENT)
Dept: CARE COORDINATION | Facility: CLINIC | Age: 49
End: 2024-06-03
Payer: COMMERCIAL

## 2024-06-03 NOTE — PROGRESS NOTES
Discharge Facility:[facility]  Discharge Diagnosis:Acute pyelonephritis   Admission Date:5/31/2024  Discharge Date: 6/2/2024    PCP Appointment Date:6/6/2024  Specialist Appointment Date: N/A  Hospital Encounter and Summary: Linked   See discharge assessment below for further details  Engagement  Call Start Time: 1618 (6/3/2024  4:37 PM)    Medications  Medications reviewed with patient/caregiver?: Yes (6/3/2024  4:37 PM)  Is the patient having any side effects they believe may be caused by any medication additions or changes?: No (6/3/2024  4:37 PM)  Does the patient have all medications ordered at discharge?: Yes (6/3/2024  4:37 PM)  Care Management Interventions: No intervention needed (6/3/2024  4:37 PM)  Is the patient taking all medications as directed (includes completed medication regime)?: Yes (6/3/2024  4:37 PM)    Appointments  Does the patient have a primary care provider?: Yes (6/3/2024  4:37 PM)  Care Management Interventions: Verified appointment date/time/provider (6/6/2024) (6/3/2024  4:37 PM)  Has the patient kept scheduled appointments due by today?: Yes (6/3/2024  4:37 PM)    Self Management  What is the home health agency?: -- (N/A) (6/3/2024  4:37 PM)  What Durable Medical Equipment (DME) was ordered?: -- (N/A) (6/3/2024  4:37 PM)    Patient Teaching  Does the patient have access to their discharge instructions?: Yes (6/3/2024  4:37 PM)  What is the patient's perception of their health status since discharge?: Improving (6/3/2024  4:37 PM)  Is the patient/caregiver able to teach back the hierarchy of who to call/visit for symptoms/problems? PCP, Specialist, Home Health nurse, Urgent Care, ED, 911: Yes (6/3/2024  4:37 PM)    Wrap Up  Wrap Up Additional Comments: -- (Lien is feeling better, still some back aches, but ibuprofen has been helping. She states she has also been having headaches and terrible sweats, no fever. She will discuss at PCP fu.) (6/3/2024  4:37 PM)

## 2024-06-05 LAB
BACTERIA BLD CULT: NORMAL
BACTERIA BLD CULT: NORMAL
BACTERIA UR CULT: ABNORMAL

## 2024-06-06 ENCOUNTER — OFFICE VISIT (OUTPATIENT)
Dept: PRIMARY CARE | Facility: CLINIC | Age: 49
End: 2024-06-06
Payer: COMMERCIAL

## 2024-06-06 VITALS
OXYGEN SATURATION: 98 % | BODY MASS INDEX: 21.92 KG/M2 | WEIGHT: 140 LBS | SYSTOLIC BLOOD PRESSURE: 120 MMHG | HEART RATE: 71 BPM | DIASTOLIC BLOOD PRESSURE: 78 MMHG | RESPIRATION RATE: 14 BRPM | TEMPERATURE: 97.8 F

## 2024-06-06 DIAGNOSIS — I99.8 VASCULAR CALCIFICATION: ICD-10-CM

## 2024-06-06 DIAGNOSIS — Z11.59 NEED FOR HEPATITIS C SCREENING TEST: ICD-10-CM

## 2024-06-06 DIAGNOSIS — R79.89 ELEVATED SERUM HCG: ICD-10-CM

## 2024-06-06 DIAGNOSIS — Z00.00 WELLNESS EXAMINATION: ICD-10-CM

## 2024-06-06 DIAGNOSIS — D64.9 ANEMIA, UNSPECIFIED TYPE: ICD-10-CM

## 2024-06-06 DIAGNOSIS — K57.90 DIVERTICULOSIS: ICD-10-CM

## 2024-06-06 DIAGNOSIS — Z11.4 ENCOUNTER FOR SCREENING FOR HIV: ICD-10-CM

## 2024-06-06 DIAGNOSIS — R30.0 DYSURIA: ICD-10-CM

## 2024-06-06 DIAGNOSIS — N10 ACUTE PYELONEPHRITIS: Primary | ICD-10-CM

## 2024-06-06 LAB
POC APPEARANCE, URINE: CLEAR
POC BILIRUBIN, URINE: NEGATIVE
POC BLOOD, URINE: NEGATIVE
POC COLOR, URINE: YELLOW
POC GLUCOSE, URINE: NEGATIVE MG/DL
POC KETONES, URINE: NEGATIVE MG/DL
POC LEUKOCYTES, URINE: NEGATIVE
POC NITRITE,URINE: NEGATIVE
POC PH, URINE: 7 PH
POC PROTEIN, URINE: NEGATIVE MG/DL
POC SPECIFIC GRAVITY, URINE: <=1.005
POC UROBILINOGEN, URINE: 0.2 EU/DL

## 2024-06-06 PROCEDURE — 1036F TOBACCO NON-USER: CPT | Performed by: FAMILY MEDICINE

## 2024-06-06 PROCEDURE — 99214 OFFICE O/P EST MOD 30 MIN: CPT | Performed by: FAMILY MEDICINE

## 2024-06-06 PROCEDURE — 81003 URINALYSIS AUTO W/O SCOPE: CPT | Performed by: FAMILY MEDICINE

## 2024-06-06 ASSESSMENT — ENCOUNTER SYMPTOMS
FLANK PAIN: 0
HEMATURIA: 0
FATIGUE: 1
DIFFICULTY URINATING: 0
FREQUENCY: 0

## 2024-06-06 NOTE — ASSESSMENT & PLAN NOTE
Family history of elevated cholesterol.  Will schedule cardiac scoring CT to check for calcium buildup.  Obtain updated fasting lipid  If ratio >3.4 or LDL >100 will advise statin for primary vascular disease prevention

## 2024-06-06 NOTE — PROGRESS NOTES
Subjective   Patient ID: Lien Lee is a 48 y.o. female who presents for Hospital Follow-up.    Discharge Facility:[facility]  Discharge Diagnosis:Acute pyelonephritis   Admission Date:5/31/2024  Discharge Date: 6/2/2024    Per patient she is just feeling very exhausted.   Was given Fluconazole and Keflex- has not taken the fluconazole,was told to wait until the end of the keflex. She is on day 4 of day 10.     She originally thought she may have had a UTI and was experiencing left flank pain. She also started sweating excessively, having chills, fever. She did a Covid test that was negative. A local [facility] tested her for everything they could including doing a urinalysis due to blood in her urine. She was diagnosed with a kidney infection that started with a UTI. Currently, most of her symptoms have completely resolved but she is still feeling very fatigued. She states that she has not had a UTI in over a decade and was not aware that any of the symptoms she was experiencing were linked to one.   Mother had history of high cholesterol and required placement of several stents. Her mother was known to not eat a very healthy diet.         Review of Systems   Constitutional:  Positive for fatigue.   Genitourinary:  Negative for difficulty urinating, flank pain, frequency, hematuria and urgency.       Objective   /78 (BP Location: Right arm, Patient Position: Sitting, BP Cuff Size: Adult)   Pulse 71   Temp 36.6 °C (97.8 °F) (Temporal)   Resp 14   Wt 63.5 kg (140 lb)   SpO2 98%   BMI 21.92 kg/m²     Physical Exam  Constitutional:       Appearance: Normal appearance.   HENT:      Head: Normocephalic.   Eyes:      Conjunctiva/sclera: Conjunctivae normal.   Abdominal:      Tenderness: There is abdominal tenderness (mild to palpation).   Musculoskeletal:      Cervical back: Normal range of motion.   Neurological:      General: No focal deficit present.      Mental Status: She is alert.   Psychiatric:          Mood and Affect: Mood normal.         Behavior: Behavior normal.         Thought Content: Thought content normal.         Judgment: Judgment normal.         Assessment/Plan   Problem List Items Addressed This Visit       Wellness examination    Relevant Orders    Lipid Panel    Acute pyelonephritis - Primary     Inpatient 5/31-6/2.  Most symptoms have resolved - still fatigued.  Currently on day 4/10 of Keflex, will start Fluconazole after completing course of Keflex.  Plan to repeat urinalysis after both medications are completed.         Relevant Orders    Urine Culture    CBC and Auto Differential    Anemia     Recent blood work done while inpatient CBC hgb 11.2  Orders placed to reassess levels and determine if there is a need for iron supplement.          Relevant Orders    CBC and Auto Differential    Diverticulosis     Advised increase dietary fiber and supplement with psyllium husk fiber          Vascular calcification     Family history of elevated cholesterol.  Will schedule cardiac scoring CT to check for calcium buildup.  Obtain updated fasting lipid  If ratio >3.4 or LDL >100 will advise statin for primary vascular disease prevention         Relevant Orders    CT cardiac scoring wo IV contrast    Elevated serum hCG     Tested as inpatient 5.31.2024 (uncertain why - as history of hysterectomy)  Level =5    Repeat  Check FSH also  Evaluate for possible pituitary issue         Relevant Orders    HCG, quantitative, pregnancy    FSH     Other Visit Diagnoses       Dysuria        Relevant Orders    POCT UA Automated manually resulted (Completed)    Encounter for screening for HIV        Relevant Orders    HIV 1/2 Antigen/Antibody Screen with Reflex to Confirmation    Need for hepatitis C screening test        Relevant Orders    Hepatitis C Antibody          Follow-up: Scheduled 3/19/2025    Scribe Attestation  By signing my name below, INuzhat Scribe   attest that this documentation has been  prepared under the direction and in the presence of Caleb Hudson MD.

## 2024-06-06 NOTE — ASSESSMENT & PLAN NOTE
Tested as inpatient 5.31.2024 (uncertain why - as history of hysterectomy)  Level =5    Repeat  Check FSH also  Evaluate for possible pituitary issue

## 2024-06-06 NOTE — ASSESSMENT & PLAN NOTE
Inpatient 5/31-6/2.  Most symptoms have resolved - still fatigued.  Currently on day 4/10 of Keflex, will start Fluconazole after completing course of Keflex.  Plan to repeat urinalysis after both medications are completed.

## 2024-06-06 NOTE — ASSESSMENT & PLAN NOTE
Recent blood work done while inpatient CBC hgb 11.2  Orders placed to reassess levels and determine if there is a need for iron supplement.

## 2024-06-17 ENCOUNTER — PATIENT OUTREACH (OUTPATIENT)
Dept: CARE COORDINATION | Facility: CLINIC | Age: 49
End: 2024-06-17
Payer: COMMERCIAL

## 2024-06-17 NOTE — PROGRESS NOTES
Call regarding appt. with PCP on 6/6/2024 after hospitalization.  At time of outreach call the patient feels as if their condition has worsened since last visit.  Reviewed the PCP appointment with the pt and addressed any questions or concerns.  Lien is not doing well today. She just finished abx and feels like getting another UTI. She has reached out to PCP to inform and will [facility]

## 2024-06-18 ENCOUNTER — TELEPHONE (OUTPATIENT)
Dept: PRIMARY CARE | Facility: CLINIC | Age: 49
End: 2024-06-18
Payer: COMMERCIAL

## 2024-06-18 DIAGNOSIS — N30.00 ACUTE CYSTITIS WITHOUT HEMATURIA: Primary | ICD-10-CM

## 2024-06-18 DIAGNOSIS — R30.0 DYSURIA: ICD-10-CM

## 2024-06-18 RX ORDER — CIPROFLOXACIN 500 MG/1
500 TABLET ORAL 2 TIMES DAILY
Qty: 14 TABLET | Refills: 0 | Status: SHIPPED | OUTPATIENT
Start: 2024-06-18 | End: 2024-06-25

## 2024-06-18 NOTE — TELEPHONE ENCOUNTER
Per patient, she denies fever/chills but has had back pain    Aware of other cipro prescription     They did do a culture which they told patient will come back tomorrow (can be pulled in through care everywhere tomorrow)

## 2024-06-18 NOTE — TELEPHONE ENCOUNTER
Please call and verify she is currently taking the cipro 500 mg twice a day. Please verify she is not currently having any fevers or chills or back pain. If she is eating and drinking normally,  I will send in another 7 days of the cipro for her to take a total of 10 days of cipro,  once she has finished all the antibiotics she can come in for a repeat urine and blood work at that time , If she starts to have fevers chills or sweats on the cipro she will need to go to the ER/   Also please check with the Urgent care to determine if they send the urine for a culture or not?

## 2024-06-18 NOTE — TELEPHONE ENCOUNTER
Regarding: Update for you  Contact: [phone]  ----- Message from Celena Nunes MA sent at 6/18/2024  3:28 PM EDT -----  Patient states a culture was done at [facility] yesterday.  She is concerned this will end up going to her kidneys like previously when she ended up needing IV antibiotics and was admitted 5/31 for two nights with fever, chills, back pain, hematuria.  She is concerned this is not being addressed as seriously as she would like     ----- Message from Lien Lee to Caleb Meléndez MD sent at 6/18/2024  1:58 PM -----   Good afternoon. Dr meléndez wanted me off the antibiotics for 1 to 2 weeks before getting the additional blood work done. I am now taking the cipro. I provided a urine sample yesterday at the [facility] which should have uploaded into this system. When do you recommend I get another urine sample since I just had one done yesterday? I just want to make sure the timing is correct on all of these and I think it's time for a referral. Thank you      ----- Message -----       From:Lien Lee       Sent:6/18/2024  1:57 PM EDT         To:Patient Medical Advice Request Message List    Subject:Update for you    Good afternoon. Dr meléndez wanted me off the antibiotics for 1 to 2 weeks before getting the additional blood work done. I am now taking the cipro. I provided a urine sample yesterday at the [facility] which should have uploaded into this system. When do you recommend I get another urine sample since I just had one done yesterday? I just want to make sure the timing is correct on all of these and I think it's time for a referral. Thank you      ----- Message -----       From:Danny Bazzi       Sent:6/18/2024 12:49 PM EDT         To:Lien Lee    Subject:Update for you    I am covering for Dr. Meléndez, I placed new orders for a repeat Urine Culture, and the labs he ordered are in the system, I would encourage you to have the labs done at this  time      ----- Message -----       From:Lien Lee       Sent:6/17/2024  4:05 PM EDT         To:Patient Medical Advice Request Message List    Subject:Update for you    Good afternoon.    I just came from the [facility] clinic and I have another uti. The nurse practitioner has ordered 3 days of cipro, but I would like to go get the blood work you requested first thing Wednesday morning and get the results of the cultures from this urinalysis before I take the Cipro if you think it is safe.    I'm curious as to your professional opinion on this. I certainly don't want to let this get any worse, but if there is an underlying issue, getting this blood work done is imperative.    Thank you      ----- Message -----       From:Lien Lee       Sent:6/16/2024  6:07 PM EDT         To:Patient Medical Advice Request Message List    Subject:Update for you    Truman Gutierrez.     I've scheduled an appointment with the [facility] clinic for a urinalysis for tomorrow at 1230.    I feel lower back pain on the left, as I did before, and discomfort in what I think may be the ureter on that side as well.    I'm planning to get all the bloodwork Dr Hudson ordered Wednesday morning as well.     I'll keep you posted.       ----- Message -----       From:Brenda PIZANO       Sent:6/15/2024  9:41 AM EDT         To:Lien Lee    Subject:Update for you    Truman Emmanuel,     Would you like to be seen for the discomfort? We can do an evaluation this week if you need.       ----- Message -----       From:Lien Lee       Sent:6/15/2024  8:06 AM EDT         To:Caleb Hudson    Subject:Update for you    Good morning, Dr. Hudson.    I've completed my round of antibiotics and I just wanted to follow up with you on a few things prior to my blood work.    When my symptoms set in the week prior to being in the hospital, I noticed I was waking up in the middle of the night with a very dry mouth. That is still happening.    I have also noticed an ongoing  discomfort in my lower left abdominal area. Just above my bladder. I've noticed this for a long time and I guess I used to attribute it to ovulation, but it seems to have intensified.    My lumbar spine has also been starting to ache again over the last 2 days. If it makes its way up towards my kidneys again or I start to feel any of those previous symptoms, I will reach out or [facility]. It could just be stiffness, but I have been quite active the last several days.    I just wanted to give you an update in case you wanted to add any additional tests.    Have a great weekend.

## 2024-06-19 ENCOUNTER — TELEPHONE (OUTPATIENT)
Dept: PRIMARY CARE | Facility: CLINIC | Age: 49
End: 2024-06-19
Payer: COMMERCIAL

## 2024-06-19 NOTE — TELEPHONE ENCOUNTER
----- Message from Celena Nunes MA sent at 6/19/2024 10:15 AM EDT -----  Regarding: FW: Update for you  Contact: [phone]  Patient had UA/culture done at [facility], these are culture results.  She is asking if she should redo culture at our lab?     ----- Message -----  From: Lien Lee  Sent: 6/19/2024  10:15 AM EDT  To: [facility] [place] [address] Clinical Support Staff  Subject: Update for you                                   Per your request Magda

## 2024-06-19 NOTE — TELEPHONE ENCOUNTER
"Danny Bazzi MD   to Lien Lee         6/19/24 12:04 PM  The recent urine culture is showing \"Mixed Alexia\"  this can sometimes be caused from not collecting a \"Clean Catch\"  if you are feeling better, I would recommend you finish the antibiotics to be certain all bacteria have cleared and then repeat a Urine culture 1 week after you finish the last antibiotic    Last read by Lien Lee at 12:42 PM on 6/19/2024.  "

## 2024-06-19 NOTE — TELEPHONE ENCOUNTER
Patient also would like to know if any STI's can cause these issues    If so, can testing be ordered?

## 2024-06-19 NOTE — TELEPHONE ENCOUNTER
(Covering for Dr. Hudson)  Since she started Cipro, per separate message/phone call today, I don't think a culture is needed, would recommend getting it 1 week or so after off of antibiotics.  There have been an extensive number of messages regarding this... if there are any other questions, she should be seen

## 2024-06-19 NOTE — TELEPHONE ENCOUNTER
Patient called, see recent my chart messages. She is going to begin taking the 500 mg of cipro today but wanted to let us know that she is having increasing back pain. It was on one side but now she feels it on both. Patient denies any fever or chills.

## 2024-06-24 NOTE — TELEPHONE ENCOUNTER
Patient is requesting you take a look at her last few messages     She is also requesting a urology referral, states CT in hospital showed a narrowing of ureter and she is wondering if this will be a chronic issue     Aware you are out of the office until tomorrow

## 2024-06-25 DIAGNOSIS — N10 ACUTE PYELONEPHRITIS: Primary | ICD-10-CM

## 2024-06-25 NOTE — TELEPHONE ENCOUNTER
Patient is aware    She was told to get UA/culture done after antibiotics are finished    She is asking when you would like this done (if it is after today, culture will need re-ordered since it expires today)

## 2024-06-25 NOTE — TELEPHONE ENCOUNTER
Inform I reviewed  See that culture was negative  Lab orders are in so she can get done  I placed urology referral for Funmi

## 2024-06-28 ENCOUNTER — HOSPITAL ENCOUNTER (OUTPATIENT)
Dept: RADIOLOGY | Facility: CLINIC | Age: 49
Discharge: HOME | End: 2024-06-28
Payer: COMMERCIAL

## 2024-06-28 DIAGNOSIS — I99.8 VASCULAR CALCIFICATION: ICD-10-CM

## 2024-06-28 PROCEDURE — 75571 CT HRT W/O DYE W/CA TEST: CPT

## 2024-07-01 NOTE — PROGRESS NOTES
Subjective   Patient ID: Lien Lee is a 48 y.o. female who presents for persistent UTIs, urinary urgency, frequency, post coital bleeding and vaginal atrophy.   HPI  48 y.o.  patient presenting as a referral from Dr. Caleb Hudson  with complaints of persistent UTIs, urinary urgency, frequency, post coital bleeding and vaginal atrophy.     The patient presents with recurrent UTIs for the past month. She notes tingling, fever, microscopic hematuria, dysuria and burning. Her culture from 5/31 demonstrated E coli and was prescribed Keflex. She reports that her symptoms have not completely resolved and continue to note flank pain and dysuria. She does note urinary urgency and frequency complaints particularly in the morning. She notes 1-2 episodes of nocturia but denies nay enuresis. She voids times during the day with episodes of incontinence in between. She denies any leaking with laughing, coughing and sneezing. She denies any History of nephrolithiasis, gross hematuria or chronic recurrent UTIs.      She is sexually active and notes some bleeding post coitally. She denies any  abnormal vaginal  discharge. She denies any vaginal dryness or irritation. She denies any bulge complaints, no pressure or pulling. She is s/p hysterectomy in 2021 followed by removal of adhesions x 2. She is on Imvexxy for her vaginal atrophy and fissures.     She denies any bowel related complaints, no fecal or flatal incontinence.    She has no other complaints.    Review of Systems  Constitutional: No fever, No chills and No fatigue.   Eyes: No vision problems and No dryness of the eyes.   ENT: No dry mouth, No hearing loss and No nosebleeds.   Cardiovascular: No chest pain, No palpitations and No orthopnea.   Respiratory: No shortness of breath, No cough and No wheezing.   Gastrointestinal: No abdominal pain, No constipation, No nausea, No diarrhea, No vomiting and No melena.   Genitourinary: As noted in HPI.   Musculoskeletal: No  back pain, No myalgias, No muscle weakness, No joint swelling and No leg edema.   Integumentary: No rashes, No skin lesion and No itching.   Neurological: No headache, No numbness and No dizziness.   Psychiatric: No sleep disturbances, No anxiety and No depression.   Endocrine: No hot flashes, No loss of hair and No hirsutism.   Hematologic/Lymphatic: No swollen glands, No tendency for easy bleeding and No tendency for easy bruising.   All other systems have been reviewed and are negative for complaint.        Objective   Physical Exam  PHYSICAL EXAMINATION:  No LMP recorded. Patient has had a hysterectomy.  There is no height or weight on file to calculate BMI.  There were no vitals taken for this visit.  General Appearance: well appearing  Neuro: Alert and oriented   HEENT: mucous membranes moist, neck supple  Resp: No respiratory distress, normal work of breathing  MSK: normal range of motion, gait appropriate    Pelvic:  Genitourinary:  normal external genitalia, Bartholin's glands negative, [place]'s glands negative  Urethra   normal meatus, non-tender, no periurethral mass  Vaginal mucosa  normal, tenderness of the vaginal well-healed cuff  Cervix surgically absent  Uterus surgically absent  Adnexae  negative nontender, no masses  Atrophy positive    CST negative  Pelvic floor muscle contraction  3/5, no pain throughout pelvic exam    POP-Q (in supine position):  Stage 0  Rectal: no hemorrhoids, fissures or masses    Assessment/Plan   48-year-old with recent UTI, vaginal atrophy, and pain at the vaginal apex following vaginal hysterectomy.    #1 we discussed the patient's urinary tract infection.  She was provided a standing order for urinalysis and urine culture.  We discussed not utilizing any soaps or other vaginal irritants.  She is presently on Imvexxy.  We discussed proceeding with vaginal estrogen therapy.  We discussed its safety, efficacy, proper utilization.  We discussed starting cranberry extract  tablets and d-mannose therapy.  We also discussed utilizing daily methenamine.  We discussed the possibility of prophylactic antibiotics moving forward.  We discussed proceeding with this therapy for 6 months.    2.  The patient denies any significant lower urinary tract symptoms otherwise.  She has noted some lower left abdominal pain.  This is not associated with her pelvic floor but she was noted to have some pain at the vaginal apex.  She has previously followed with Katya Estrada and we discussed restarting her vaginal wand and pelvic floor physical therapy exercises.    3.  As above, we discussed the patient's vaginal atrophy and the safety, efficacy, proper utilization of vaginal estrogen therapy.    4.  Patient will follow-up on an as-needed basis moving forward.    JUDITH Winslow MD      Scribe Attestation  By signing my name below, I, Gita Verdin attest that this documentation has been prepared under the direction and in the presence of Honorio Winslow MD. All medical record entries made by the Scribe were at my direction or personally dictated by me. I have reviewed the chart and agree that the record accurately reflects my personal performance of the history, physical exam, discussion and plan.

## 2024-07-02 ENCOUNTER — OFFICE VISIT (OUTPATIENT)
Dept: UROLOGY | Facility: CLINIC | Age: 49
End: 2024-07-02
Payer: COMMERCIAL

## 2024-07-02 VITALS
HEART RATE: 73 BPM | WEIGHT: 143.3 LBS | SYSTOLIC BLOOD PRESSURE: 146 MMHG | BODY MASS INDEX: 22.44 KG/M2 | DIASTOLIC BLOOD PRESSURE: 87 MMHG

## 2024-07-02 DIAGNOSIS — R10.2 PELVIC PAIN: ICD-10-CM

## 2024-07-02 DIAGNOSIS — R30.0 DYSURIA: ICD-10-CM

## 2024-07-02 DIAGNOSIS — N39.0 CHRONIC UTI: Primary | ICD-10-CM

## 2024-07-02 DIAGNOSIS — N95.2 VAGINAL ATROPHY: ICD-10-CM

## 2024-07-02 DIAGNOSIS — N10 ACUTE PYELONEPHRITIS: ICD-10-CM

## 2024-07-02 LAB
POC APPEARANCE, URINE: CLEAR
POC BILIRUBIN, URINE: NEGATIVE
POC BLOOD, URINE: ABNORMAL
POC COLOR, URINE: YELLOW
POC GLUCOSE, URINE: NEGATIVE MG/DL
POC KETONES, URINE: NEGATIVE MG/DL
POC LEUKOCYTES, URINE: NEGATIVE
POC NITRITE,URINE: NEGATIVE
POC PH, URINE: 7 PH
POC PROTEIN, URINE: NEGATIVE MG/DL
POC SPECIFIC GRAVITY, URINE: 1.01
POC UROBILINOGEN, URINE: 0.2 EU/DL

## 2024-07-02 PROCEDURE — 99214 OFFICE O/P EST MOD 30 MIN: CPT | Performed by: OBSTETRICS & GYNECOLOGY

## 2024-07-02 PROCEDURE — 81003 URINALYSIS AUTO W/O SCOPE: CPT | Performed by: OBSTETRICS & GYNECOLOGY

## 2024-07-02 PROCEDURE — 1036F TOBACCO NON-USER: CPT | Performed by: OBSTETRICS & GYNECOLOGY

## 2024-07-02 PROCEDURE — 87086 URINE CULTURE/COLONY COUNT: CPT | Performed by: OBSTETRICS & GYNECOLOGY

## 2024-07-02 RX ORDER — ESTRADIOL 0.1 MG/G
CREAM VAGINAL
Qty: 42.5 G | Refills: 3 | Status: SHIPPED | OUTPATIENT
Start: 2024-07-02

## 2024-07-02 RX ORDER — METHENAMINE HIPPURATE 1000 MG/1
1 TABLET ORAL 2 TIMES DAILY
Qty: 60 TABLET | Refills: 2 | Status: SHIPPED | OUTPATIENT
Start: 2024-07-02 | End: 2024-09-30

## 2024-07-02 RX ORDER — PHENAZOPYRIDINE HYDROCHLORIDE 100 MG/1
100 TABLET, FILM COATED ORAL 3 TIMES DAILY PRN
Qty: 30 TABLET | Refills: 3 | Status: SHIPPED | OUTPATIENT
Start: 2024-07-02

## 2024-07-02 NOTE — PATIENT INSTRUCTIONS
Please start daily cranberry extract tablets and d-mannose therapy.  This can be picked up your local health food store, online, or pharmacy.    Please start your twice daily methenamine to prevent urinary tract infections.    Do not use any vaginal irritants including soaps or wipes.    Please start your vaginal estrogen therapy nightly for 2 weeks then 3 times a week thereafter.  Do not use the plastic applicator.  You may continue your Imvexxy if you so choose.    You have been provided a new order for pelvic floor physical therapy to help with your vaginal cuff scar tissue discomfort.    You have been provided a standing order for urinalysis and urine culture.  Should you have any UTI symptoms, please present immediately to any [facility] lab for a urine sample.    Please contact the clinic with any questions or concerns.    [phone]

## 2024-07-03 LAB — BACTERIA UR CULT: NO GROWTH

## 2024-07-08 ENCOUNTER — LAB (OUTPATIENT)
Dept: LAB | Facility: LAB | Age: 49
End: 2024-07-08
Payer: COMMERCIAL

## 2024-07-08 DIAGNOSIS — Z11.59 NEED FOR HEPATITIS C SCREENING TEST: ICD-10-CM

## 2024-07-08 DIAGNOSIS — R79.89 ELEVATED SERUM HCG: ICD-10-CM

## 2024-07-08 DIAGNOSIS — N10 ACUTE PYELONEPHRITIS: ICD-10-CM

## 2024-07-08 DIAGNOSIS — Z11.4 ENCOUNTER FOR SCREENING FOR HIV: ICD-10-CM

## 2024-07-08 DIAGNOSIS — D64.9 ANEMIA, UNSPECIFIED TYPE: ICD-10-CM

## 2024-07-08 DIAGNOSIS — Z00.00 WELLNESS EXAMINATION: ICD-10-CM

## 2024-07-08 DIAGNOSIS — N30.00 ACUTE CYSTITIS WITHOUT HEMATURIA: ICD-10-CM

## 2024-07-08 LAB
APPEARANCE UR: CLEAR
B-HCG SERPL-ACNC: 3 MIU/ML
BASOPHILS # BLD AUTO: 0.08 X10*3/UL (ref 0–0.1)
BASOPHILS NFR BLD AUTO: 1.5 %
BILIRUB UR STRIP.AUTO-MCNC: NEGATIVE MG/DL
CHOLEST SERPL-MCNC: 185 MG/DL (ref 0–199)
CHOLESTEROL/HDL RATIO: 3
COLOR UR: NORMAL
EOSINOPHIL # BLD AUTO: 0.35 X10*3/UL (ref 0–0.7)
EOSINOPHIL NFR BLD AUTO: 6.7 %
ERYTHROCYTE [DISTWIDTH] IN BLOOD BY AUTOMATED COUNT: 12.9 % (ref 11.5–14.5)
FSH SERPL-ACNC: 62.4 IU/L
GLUCOSE UR STRIP.AUTO-MCNC: NORMAL MG/DL
HCT VFR BLD AUTO: 39.5 % (ref 36–46)
HCV AB SER QL: NONREACTIVE
HDLC SERPL-MCNC: 61.8 MG/DL
HGB BLD-MCNC: 13 G/DL (ref 12–16)
HIV 1+2 AB+HIV1 P24 AG SERPL QL IA: NONREACTIVE
HOLD SPECIMEN: NORMAL
IMM GRANULOCYTES # BLD AUTO: 0.01 X10*3/UL (ref 0–0.7)
IMM GRANULOCYTES NFR BLD AUTO: 0.2 % (ref 0–0.9)
KETONES UR STRIP.AUTO-MCNC: NEGATIVE MG/DL
LDLC SERPL CALC-MCNC: 111 MG/DL
LEUKOCYTE ESTERASE UR QL STRIP.AUTO: NEGATIVE
LYMPHOCYTES # BLD AUTO: 2.31 X10*3/UL (ref 1.2–4.8)
LYMPHOCYTES NFR BLD AUTO: 44.5 %
MCH RBC QN AUTO: 29.8 PG (ref 26–34)
MCHC RBC AUTO-ENTMCNC: 32.9 G/DL (ref 32–36)
MCV RBC AUTO: 91 FL (ref 80–100)
MONOCYTES # BLD AUTO: 0.4 X10*3/UL (ref 0.1–1)
MONOCYTES NFR BLD AUTO: 7.7 %
NEUTROPHILS # BLD AUTO: 2.04 X10*3/UL (ref 1.2–7.7)
NEUTROPHILS NFR BLD AUTO: 39.4 %
NITRITE UR QL STRIP.AUTO: NEGATIVE
NON HDL CHOLESTEROL: 123 MG/DL (ref 0–149)
NRBC BLD-RTO: 0 /100 WBCS (ref 0–0)
PH UR STRIP.AUTO: 6.5 [PH]
PLATELET # BLD AUTO: 213 X10*3/UL (ref 150–450)
PROT UR STRIP.AUTO-MCNC: NEGATIVE MG/DL
RBC # BLD AUTO: 4.36 X10*6/UL (ref 4–5.2)
RBC # UR STRIP.AUTO: NEGATIVE /UL
SP GR UR STRIP.AUTO: 1.01
TRIGL SERPL-MCNC: 60 MG/DL (ref 0–149)
UROBILINOGEN UR STRIP.AUTO-MCNC: NORMAL MG/DL
VLDL: 12 MG/DL (ref 0–40)
WBC # BLD AUTO: 5.2 X10*3/UL (ref 4.4–11.3)

## 2024-07-08 PROCEDURE — 80061 LIPID PANEL: CPT

## 2024-07-08 PROCEDURE — 81003 URINALYSIS AUTO W/O SCOPE: CPT

## 2024-07-08 PROCEDURE — 84702 CHORIONIC GONADOTROPIN TEST: CPT

## 2024-07-08 PROCEDURE — 36415 COLL VENOUS BLD VENIPUNCTURE: CPT

## 2024-07-08 PROCEDURE — 87389 HIV-1 AG W/HIV-1&-2 AB AG IA: CPT

## 2024-07-08 PROCEDURE — 83001 ASSAY OF GONADOTROPIN (FSH): CPT

## 2024-07-08 PROCEDURE — 86803 HEPATITIS C AB TEST: CPT

## 2024-07-08 PROCEDURE — 85025 COMPLETE CBC W/AUTO DIFF WBC: CPT

## 2024-07-11 DIAGNOSIS — R03.0 ELEVATED BP WITHOUT DIAGNOSIS OF HYPERTENSION: ICD-10-CM

## 2024-07-11 RX ORDER — ESCITALOPRAM OXALATE 10 MG/1
10 TABLET ORAL DAILY
Qty: 90 TABLET | Refills: 1 | Status: SHIPPED | OUTPATIENT
Start: 2024-07-11 | End: 2025-01-07

## 2024-07-17 ENCOUNTER — PATIENT OUTREACH (OUTPATIENT)
Dept: CARE COORDINATION | Facility: CLINIC | Age: 49
End: 2024-07-17
Payer: COMMERCIAL

## 2024-07-23 DIAGNOSIS — N95.1 MENOPAUSAL SYMPTOM: Primary | ICD-10-CM

## 2024-07-24 DIAGNOSIS — N39.0 CHRONIC UTI: ICD-10-CM

## 2024-07-24 RX ORDER — PSYLLIUM HUSK 0.4 G
CAPSULE ORAL
COMMUNITY
Start: 2024-06-14

## 2024-07-24 RX ORDER — METHENAMINE HIPPURATE 1000 MG/1
1 TABLET ORAL 2 TIMES DAILY
Qty: 180 TABLET | Refills: 1 | Status: SHIPPED | OUTPATIENT
Start: 2024-07-24

## 2024-08-27 ENCOUNTER — LAB (OUTPATIENT)
Dept: LAB | Facility: LAB | Age: 49
End: 2024-08-27
Payer: COMMERCIAL

## 2024-08-27 DIAGNOSIS — N39.0 CHRONIC UTI: ICD-10-CM

## 2024-08-27 DIAGNOSIS — N95.1 MENOPAUSAL SYMPTOM: ICD-10-CM

## 2024-08-27 LAB
APPEARANCE UR: CLEAR
BILIRUB UR STRIP.AUTO-MCNC: NEGATIVE MG/DL
COLOR UR: COLORLESS
ESTRADIOL SERPL-MCNC: 23 PG/ML
GLUCOSE UR STRIP.AUTO-MCNC: NORMAL MG/DL
KETONES UR STRIP.AUTO-MCNC: NEGATIVE MG/DL
LEUKOCYTE ESTERASE UR QL STRIP.AUTO: NEGATIVE
NITRITE UR QL STRIP.AUTO: NEGATIVE
PH UR STRIP.AUTO: 6.5 [PH]
PROT UR STRIP.AUTO-MCNC: NEGATIVE MG/DL
RBC # UR STRIP.AUTO: NEGATIVE /UL
SP GR UR STRIP.AUTO: 1
UROBILINOGEN UR STRIP.AUTO-MCNC: NORMAL MG/DL

## 2024-08-27 PROCEDURE — 81003 URINALYSIS AUTO W/O SCOPE: CPT

## 2024-08-27 PROCEDURE — 82670 ASSAY OF TOTAL ESTRADIOL: CPT

## 2024-08-27 PROCEDURE — 36415 COLL VENOUS BLD VENIPUNCTURE: CPT

## 2024-08-28 LAB — HOLD SPECIMEN: NORMAL

## 2024-08-30 ENCOUNTER — PATIENT OUTREACH (OUTPATIENT)
Dept: PRIMARY CARE | Facility: CLINIC | Age: 49
End: 2024-08-30
Payer: COMMERCIAL

## 2024-08-30 NOTE — PROGRESS NOTES
Unable to reach patient for call back after patient's follow up appointment with PCP.   GENESISM with call back number for patient to call if needed   If no voicemail available call attempts x 2 were made to contact the patient to assist with any questions or concerns patient may have.

## 2024-09-18 DIAGNOSIS — K57.90 DIVERTICULOSIS: Primary | ICD-10-CM

## 2024-09-18 RX ORDER — HYOSCYAMINE SULFATE 0.12 MG/1
0.12 TABLET, ORALLY DISINTEGRATING ORAL EVERY 4 HOURS PRN
Qty: 30 TABLET | Refills: 1 | Status: SHIPPED | OUTPATIENT
Start: 2024-09-18

## 2024-10-08 ENCOUNTER — APPOINTMENT (OUTPATIENT)
Dept: OBSTETRICS AND GYNECOLOGY | Facility: CLINIC | Age: 49
End: 2024-10-08
Payer: COMMERCIAL

## 2024-10-08 VITALS
SYSTOLIC BLOOD PRESSURE: 120 MMHG | BODY MASS INDEX: 23.07 KG/M2 | DIASTOLIC BLOOD PRESSURE: 68 MMHG | HEIGHT: 67 IN | WEIGHT: 147 LBS

## 2024-10-08 DIAGNOSIS — Z01.419 WELL WOMAN EXAM: ICD-10-CM

## 2024-10-08 DIAGNOSIS — Z12.31 ENCOUNTER FOR SCREENING MAMMOGRAM FOR BREAST CANCER: ICD-10-CM

## 2024-10-08 PROCEDURE — 1036F TOBACCO NON-USER: CPT | Performed by: OBSTETRICS & GYNECOLOGY

## 2024-10-08 PROCEDURE — 3008F BODY MASS INDEX DOCD: CPT | Performed by: OBSTETRICS & GYNECOLOGY

## 2024-10-08 PROCEDURE — 99396 PREV VISIT EST AGE 40-64: CPT | Performed by: OBSTETRICS & GYNECOLOGY

## 2024-10-08 NOTE — PROGRESS NOTES
"Lien Lee is a 49 y.o. female who is here for a routine exam. PCP = Caleb Hudson MD  Recently hospitalized for pyelonephritis  Saw urogynecologist who started patient on vaginal estrogen and antibiotic daily  Complains of poor sleep, wakes up at 330 every morning and cannot go back to sleep  Does have hot flashes and night sweats  Complains of increased abdominal girth  Imvexxy has helped with dyspareunia  Also sees a pelvic floor physical therapist    Menses : Hysterectomy  Contraception : other  HPV vaccine : N/A  Last pap : Uncertain  Last HPV : Uncertain  History of abnormal pap : Yes, describe: RAYMUNDO-3 status post LEEP  Last mammogram : 2023 normal  History of abnormal mammogram : No  Colon cancer screen : 2023 normal    ROS  systems reviewed, anything negative noted in HPI    bladder: no dysuria, gross hematuria, urinary frequency, urgency or incontinence  breast: no lumps, nipple d/c, overlying skin changes, redness, or skin retraction    [unfilled]    Past med hx and past surg hx reviewed and notable for:     Objective   /68   Ht 1.702 m (5' 7\")   Wt 66.7 kg (147 lb)   BMI 23.02 kg/m²      General:   Alert and oriented, in no acute distress   Neck: Supple. No visible thyromegaly.    Breast/Axilla: Normal to palpation bilaterally without masses, skin changes, lymphadenopathy, or nipple discharge.    Abdomen: Soft, non-tender, without masses or organomegaly   Vulva: Normal architecture without erythema, masses, or lesions.    Vagina: Normal mucosa without lesions, masses, or atrophy. No abnormal vaginal discharge.    Cervix:   Surgically absent   Uterus: Surgically absent   Adnexa: Normal without masses or lesions   Pelvic Floor No POP noted.    Psych Normal affect. Normal mood.      Thank you for coming to your annual exam. Your findings during the exam were normal. Specific topics addressed during this exam included: healthy lifestyle, well woman screening guidelines,     Actions performed " during this visit include:  - Clinical breast exam  - Clinical pelvic exam  - Pap no longer indicated  - Colon cancer screen up to date  - d/w patient hormone therapy options, over-the-counter options, [place]  Orders Placed This Encounter   Procedures    BI mammo bilateral screening tomosynthesis           Please return for your next visit in 1 year.

## 2024-10-09 ENCOUNTER — APPOINTMENT (OUTPATIENT)
Dept: DERMATOLOGY | Facility: CLINIC | Age: 49
End: 2024-10-09
Payer: COMMERCIAL

## 2024-10-09 DIAGNOSIS — D48.5 NEOPLASM OF UNCERTAIN BEHAVIOR OF SKIN: ICD-10-CM

## 2024-10-09 DIAGNOSIS — L57.8 PHOTOAGING OF SKIN: Primary | ICD-10-CM

## 2024-10-09 DIAGNOSIS — L81.4 LENTIGO: ICD-10-CM

## 2024-10-09 DIAGNOSIS — L82.1 SEBORRHEIC KERATOSIS: ICD-10-CM

## 2024-10-09 DIAGNOSIS — D22.5 MELANOCYTIC NEVI OF TRUNK: ICD-10-CM

## 2024-10-09 DIAGNOSIS — R03.0 ELEVATED BP WITHOUT DIAGNOSIS OF HYPERTENSION: ICD-10-CM

## 2024-10-09 DIAGNOSIS — D18.01 HEMANGIOMA OF SKIN: ICD-10-CM

## 2024-10-09 DIAGNOSIS — Z12.83 ENCOUNTER FOR SCREENING FOR MALIGNANT NEOPLASM OF SKIN: ICD-10-CM

## 2024-10-09 DIAGNOSIS — L72.0 MILIA: ICD-10-CM

## 2024-10-09 DIAGNOSIS — Z85.828 PERSONAL HISTORY OF OTHER MALIGNANT NEOPLASM OF SKIN: ICD-10-CM

## 2024-10-09 PROCEDURE — 11102 TANGNTL BX SKIN SINGLE LES: CPT | Performed by: STUDENT IN AN ORGANIZED HEALTH CARE EDUCATION/TRAINING PROGRAM

## 2024-10-09 PROCEDURE — 99213 OFFICE O/P EST LOW 20 MIN: CPT | Performed by: DERMATOLOGY

## 2024-10-09 NOTE — PROGRESS NOTES
Subjective     Lien Lee is a 49 y.o. female who presents for the following: Skin Check (H/o BCC).     Noticed a new spot on her left eyelid that appeared in the last 6 months    Review of Systems:  No other skin or systemic complaints other than what is documented elsewhere in the note.    The following portions of the chart were reviewed this encounter and updated as appropriate:         Skin Cancer History  History of BCC s/p Mohs 2016 - right medial cheek/alar crease      Specialty Problems          Dermatology Problems    History of basal cell carcinoma (BCC)     History of BCC on the right cheek in 2016 s/p Mohs             Objective   Well appearing patient in no apparent distress; mood and affect are within normal limits.    A full examination was performed including scalp, head, eyes, ears, nose, lips, neck, chest, axillae, abdomen, back, buttocks, bilateral upper extremities, bilateral lower extremities, hands, feet, fingers, toes, fingernails, and toenails. All findings within normal limits unless otherwise noted below.    Assessment/Plan   1. Photoaging of skin  Mottled pigmentation with telangiectasias and brown reticular macules in sun exposed areas of the body.    The risk of chronic, cumulative sun damage and risk of development of skin cancer was reviewed today.   The importance of sun protection was reviewed: including the use of a broad spectrum sunscreen of at least SPF 30 that protects against both UVA/UVB rays, with ingredients such as Zinc oxide or titanium dioxide, wearing sun protective clothing and sun avoidance. We reviewed the warning signs of non-melanoma skin cancer and ABCDEs of melanoma  Please follow up should you notice any new or changing pre-existing skin lesion.    2. Neoplasm of uncertain behavior of skin  Left Upper Arm - Posterior  3 x 4 mm brown and tan thin papule              Lesion biopsy  Type of biopsy: tangential    Informed consent: discussed and consent  obtained    Timeout: patient name, date of birth, surgical site, and procedure verified    Procedure prep:  Patient was prepped and draped  Anesthesia: the lesion was anesthetized in a standard fashion    Anesthetic:  1% lidocaine w/ epinephrine 1-100,000 local infiltration  Instrument used: DermaBlade    Hemostasis achieved with: aluminum chloride    Outcome: patient tolerated procedure well    Post-procedure details: sterile dressing applied and wound care instructions given    Dressing type: petrolatum and bandage      Staff Communication: Dermatology Local Anesthesia: 1 % Lidocaine / Epinephrine - Amount:    Specimen 1 - Dermatopathology- DERM LAB  Differential Diagnosis: SK vs atypical nevus  Check Margins Yes/No?:    Comments:  history of BCC in 2016  Dermpath Lab: Routine Histopathology (formalin-fixed tissue)    Lesion concerning for seborrheic keratosis vs atypical nevus. The need for biopsy for definitive diagnosis recommended. Risks and benefits reviewed, see procedure note.    3. Personal history of other malignant neoplasm of skin  Right alar crease  Well healed scar at site of prior treatment without evidence of recurrence.    There is no evidence of recurrence on clinical examination today, reassurance was provided to the patient. The importance of sun protection was reviewed with the patient including the use of a broad spectrum sunscreen that protects against both UVA/UVB rays, with ingredients such as Zinc oxide or titanium dioxide, wearing sun protective clothing and sun avoidance. Warning signs of non-melanoma skin cancer were reviewed. ABCDEs of melanoma reviewed. Patient to f/u should they notice any new or changing pre-existing skin lesion    4. Seborrheic keratosis  Brown, tan waxy macules and stuck on appearing papules and plaques    The benign nature of these skin lesions reviewed, reassure provided and no further treatment needed at this time.   These lesions can be removed, if symptomatic  (itching, bleeding, rubbing on clothing, painful), otherwise removal is considered cosmetic.     5. Hemangioma of skin  Cherry red papules    The benign nature of these skin lesions were reviewed, no treatment is necessary.   Please follow up for any new or pre-existing lesion that is changing in size, shape, color, becomes painful, tender, itches or bleed.    6. Lentigo  Scattered tan macules in sun-exposed areas.    These are benign skin lesions due to sun exposure. They will darken in response to sun exposure. They should be monitored for change in size, shape or color.  These lesions can be treated cosmetically with topical creams, liquid nitrogen and a variety of lasers.    7. Milia  Left Upper Eyelid  1 mm papule    - Discussed the nature of the diagnosis  - Reassurance, recommend continued observation     8. Melanocytic nevi of trunk  Torso - Posterior (Back)  Tan-brown symmetric macules and papules    Clinically benign appearing nevi, no treatment is necessary.  The importance of sun protection was reviewed: including the use of a broad spectrum sunscreen that protects against both UVA/UVB rays, with ingredients such as Zinc oxide or titanium dioxide, wearing sun protective clothing and sun avoidance.   ABCDEs of melanoma reviewed.  Please follow up should you notice any new or changing pre-existing skin lesion.    9. Encounter for screening for malignant neoplasm of skin  Lesion of concern on the left mid arm    The risk of chronic, cumulative sun damage and risk of development of skin cancer was reviewed today.   The importance of sun protection was reviewed: including the use of a broad spectrum sunscreen of at least SPF 30 that protects against both UVA/UVB rays, with ingredients such as Zinc oxide or titanium dioxide, wearing sun protective clothing and sun avoidance. We reviewed the warning signs of non-melanoma skin cancer and ABCDEs of melanoma  Please follow up should you notice any new or changing  pre-existing skin lesion.    Related Procedures  Follow Up In Dermatology - Established Patient        Follow up in 1 year for TARYN Mendoza MD    I saw and evaluated the patient. I personally obtained the key and critical portions of the history and physical exam or was physically present for key and critical portions performed by the resident/fellow. I reviewed the resident/fellow's documentation and discussed the patient with the resident/fellow. I agree with the resident/fellow's medical decision making as documented in the note.    Susana Grady, DO

## 2024-10-10 ENCOUNTER — LAB (OUTPATIENT)
Dept: LAB | Facility: LAB | Age: 49
End: 2024-10-10
Payer: COMMERCIAL

## 2024-10-10 ENCOUNTER — TELEPHONE (OUTPATIENT)
Dept: UROLOGY | Facility: CLINIC | Age: 49
End: 2024-10-10

## 2024-10-10 ENCOUNTER — TELEPHONE (OUTPATIENT)
Dept: PRIMARY CARE | Facility: CLINIC | Age: 49
End: 2024-10-10

## 2024-10-10 DIAGNOSIS — R39.15 URINARY URGENCY: ICD-10-CM

## 2024-10-10 DIAGNOSIS — R39.15 URINARY URGENCY: Primary | ICD-10-CM

## 2024-10-10 LAB
APPEARANCE UR: CLEAR
BILIRUB UR STRIP.AUTO-MCNC: NEGATIVE MG/DL
COLOR UR: COLORLESS
GLUCOSE UR STRIP.AUTO-MCNC: NORMAL MG/DL
HOLD SPECIMEN: NORMAL
KETONES UR STRIP.AUTO-MCNC: NEGATIVE MG/DL
LEUKOCYTE ESTERASE UR QL STRIP.AUTO: NEGATIVE
NITRITE UR QL STRIP.AUTO: NEGATIVE
PH UR STRIP.AUTO: 6 [PH]
PROT UR STRIP.AUTO-MCNC: NEGATIVE MG/DL
RBC # UR STRIP.AUTO: NEGATIVE /UL
SP GR UR STRIP.AUTO: 1.01
UROBILINOGEN UR STRIP.AUTO-MCNC: NORMAL MG/DL

## 2024-10-10 PROCEDURE — 81003 URINALYSIS AUTO W/O SCOPE: CPT

## 2024-10-10 RX ORDER — CIPROFLOXACIN 250 MG/1
250 TABLET, FILM COATED ORAL 2 TIMES DAILY
Qty: 10 TABLET | Refills: 0 | Status: SHIPPED | OUTPATIENT
Start: 2024-10-10 | End: 2024-10-15

## 2024-10-10 RX ORDER — ESCITALOPRAM OXALATE 10 MG/1
10 TABLET ORAL DAILY
Qty: 90 TABLET | Refills: 1 | Status: SHIPPED | OUTPATIENT
Start: 2024-10-10

## 2024-10-10 NOTE — TELEPHONE ENCOUNTER
Pt called stating that she believes she has a UTI  Needs an order placed for the lab, thought she had a standing order   Going out of town soon.

## 2024-10-10 NOTE — TELEPHONE ENCOUNTER
I sent cipro for 5 day supply,  if symptoms resolved after 3 days, stop taking as most often 3 days is sufficient for simple UTI.  Will monitor ua and culture and contact if different treatment needed.  Not sending stat as not clinically indicated to ask for this - only appropriate if need asap due to severe symptoms such as fever, vomiting - if is having these needs seen or urgent care)   We can cover with antibiotic and react if culture identifies need for different treatment   Call if worsens

## 2024-10-10 NOTE — TELEPHONE ENCOUNTER
Patient is asking if an antibiotic can be called in just in case since she is leaving tomorrow morning    She is also asking if orders can be done STAT, she already dropped off at the lab so informed this would be done as soon as possible but culture still takes a few days

## 2024-10-10 NOTE — TELEPHONE ENCOUNTER
Pt called today asking if poss to get order for urine cx she is leaving for [place] and having uti symptoms that started yesterday, urgency and some burning. She is at the lab and her urologist had a standing order but . I spoke to [facility] and he placed the order. Pt informed.

## 2024-10-11 LAB
LABORATORY COMMENT REPORT: NORMAL
PATH REPORT.FINAL DX SPEC: NORMAL
PATH REPORT.GROSS SPEC: NORMAL
PATH REPORT.MICROSCOPIC SPEC OTHER STN: NORMAL
PATH REPORT.RELEVANT HX SPEC: NORMAL
PATH REPORT.TOTAL CANCER: NORMAL

## 2024-12-17 ENCOUNTER — APPOINTMENT (OUTPATIENT)
Dept: RADIOLOGY | Facility: CLINIC | Age: 49
End: 2024-12-17
Payer: COMMERCIAL

## 2025-01-02 ENCOUNTER — HOSPITAL ENCOUNTER (OUTPATIENT)
Dept: RADIOLOGY | Facility: CLINIC | Age: 50
Discharge: HOME | End: 2025-01-02
Payer: COMMERCIAL

## 2025-01-02 ENCOUNTER — APPOINTMENT (OUTPATIENT)
Dept: PRIMARY CARE | Facility: CLINIC | Age: 50
End: 2025-01-02
Payer: COMMERCIAL

## 2025-01-02 VITALS — HEIGHT: 67 IN | BODY MASS INDEX: 23.07 KG/M2 | WEIGHT: 147 LBS

## 2025-01-02 DIAGNOSIS — Z12.31 ENCOUNTER FOR SCREENING MAMMOGRAM FOR BREAST CANCER: ICD-10-CM

## 2025-01-02 PROCEDURE — 77063 BREAST TOMOSYNTHESIS BI: CPT

## 2025-03-19 ENCOUNTER — APPOINTMENT (OUTPATIENT)
Dept: PRIMARY CARE | Facility: CLINIC | Age: 50
End: 2025-03-19
Payer: COMMERCIAL

## 2025-03-19 VITALS
BODY MASS INDEX: 23.62 KG/M2 | RESPIRATION RATE: 14 BRPM | HEART RATE: 89 BPM | TEMPERATURE: 98.7 F | WEIGHT: 150.5 LBS | SYSTOLIC BLOOD PRESSURE: 126 MMHG | OXYGEN SATURATION: 97 % | DIASTOLIC BLOOD PRESSURE: 85 MMHG | HEIGHT: 67 IN

## 2025-03-19 DIAGNOSIS — F41.9 ANXIETY: ICD-10-CM

## 2025-03-19 DIAGNOSIS — R41.3 MEMORY LOSS: ICD-10-CM

## 2025-03-19 DIAGNOSIS — E55.9 VITAMIN D DEFICIENCY: ICD-10-CM

## 2025-03-19 DIAGNOSIS — I99.8 VASCULAR CALCIFICATION: Primary | ICD-10-CM

## 2025-03-19 DIAGNOSIS — K57.90 DIVERTICULOSIS: ICD-10-CM

## 2025-03-19 DIAGNOSIS — N95.1 MENOPAUSAL SYMPTOMS: ICD-10-CM

## 2025-03-19 DIAGNOSIS — Z00.00 WELLNESS EXAMINATION: ICD-10-CM

## 2025-03-19 DIAGNOSIS — R93.1 AGATSTON CAC SCORE 100-199: ICD-10-CM

## 2025-03-19 DIAGNOSIS — R27.9 DROPS THINGS: ICD-10-CM

## 2025-03-19 DIAGNOSIS — Z23 NEED FOR SHINGLES VACCINE: ICD-10-CM

## 2025-03-19 PROBLEM — D64.9 ANEMIA: Status: RESOLVED | Noted: 2024-06-06 | Resolved: 2025-03-19

## 2025-03-19 PROBLEM — R79.89 ELEVATED SERUM HCG: Status: RESOLVED | Noted: 2024-06-06 | Resolved: 2025-03-19

## 2025-03-19 PROBLEM — N10 ACUTE PYELONEPHRITIS: Status: RESOLVED | Noted: 2024-05-31 | Resolved: 2025-03-19

## 2025-03-19 PROBLEM — Z01.419 WELL WOMAN EXAM: Status: RESOLVED | Noted: 2023-10-03 | Resolved: 2025-03-19

## 2025-03-19 PROBLEM — F54 PSYCHOLOGICAL FACTOR AFFECTING PHYSICAL CONDITION: Status: RESOLVED | Noted: 2023-02-25 | Resolved: 2025-03-19

## 2025-03-19 PROCEDURE — 90471 IMMUNIZATION ADMIN: CPT | Performed by: FAMILY MEDICINE

## 2025-03-19 PROCEDURE — 3008F BODY MASS INDEX DOCD: CPT | Performed by: FAMILY MEDICINE

## 2025-03-19 PROCEDURE — 99396 PREV VISIT EST AGE 40-64: CPT | Performed by: FAMILY MEDICINE

## 2025-03-19 PROCEDURE — 90656 IIV3 VACC NO PRSV 0.5 ML IM: CPT | Performed by: FAMILY MEDICINE

## 2025-03-19 ASSESSMENT — PATIENT HEALTH QUESTIONNAIRE - PHQ9
6. FEELING BAD ABOUT YOURSELF - OR THAT YOU ARE A FAILURE OR HAVE LET YOURSELF OR YOUR FAMILY DOWN: SEVERAL DAYS
5. POOR APPETITE OR OVEREATING: NOT AT ALL
8. MOVING OR SPEAKING SO SLOWLY THAT OTHER PEOPLE COULD HAVE NOTICED. OR THE OPPOSITE, BEING SO FIGETY OR RESTLESS THAT YOU HAVE BEEN MOVING AROUND A LOT MORE THAN USUAL: NOT AT ALL
3. TROUBLE FALLING OR STAYING ASLEEP OR SLEEPING TOO MUCH: NOT AT ALL
SUM OF ALL RESPONSES TO PHQ9 QUESTIONS 1 AND 2: 0
7. TROUBLE CONCENTRATING ON THINGS, SUCH AS READING THE NEWSPAPER OR WATCHING TELEVISION: SEVERAL DAYS
1. LITTLE INTEREST OR PLEASURE IN DOING THINGS: NOT AT ALL
10. IF YOU CHECKED OFF ANY PROBLEMS, HOW DIFFICULT HAVE THESE PROBLEMS MADE IT FOR YOU TO DO YOUR WORK, TAKE CARE OF THINGS AT HOME, OR GET ALONG WITH OTHER PEOPLE: SOMEWHAT DIFFICULT
2. FEELING DOWN, DEPRESSED OR HOPELESS: NOT AT ALL
9. THOUGHTS THAT YOU WOULD BE BETTER OFF DEAD, OR OF HURTING YOURSELF: NOT AT ALL
SUM OF ALL RESPONSES TO PHQ QUESTIONS 1-9: 3
4. FEELING TIRED OR HAVING LITTLE ENERGY: SEVERAL DAYS

## 2025-03-19 ASSESSMENT — ENCOUNTER SYMPTOMS
CARDIOVASCULAR NEGATIVE: 1
NUMBNESS: 1
RESPIRATORY NEGATIVE: 1
MUSCULOSKELETAL NEGATIVE: 1
CONFUSION: 1
GASTROINTESTINAL NEGATIVE: 1
DIZZINESS: 1

## 2025-03-19 ASSESSMENT — ANXIETY QUESTIONNAIRES
5. BEING SO RESTLESS THAT IT IS HARD TO SIT STILL: NOT AT ALL
4. TROUBLE RELAXING: SEVERAL DAYS
7. FEELING AFRAID AS IF SOMETHING AWFUL MIGHT HAPPEN: NOT AT ALL
2. NOT BEING ABLE TO STOP OR CONTROL WORRYING: NOT AT ALL
3. WORRYING TOO MUCH ABOUT DIFFERENT THINGS: SEVERAL DAYS
IF YOU CHECKED OFF ANY PROBLEMS ON THIS QUESTIONNAIRE, HOW DIFFICULT HAVE THESE PROBLEMS MADE IT FOR YOU TO DO YOUR WORK, TAKE CARE OF THINGS AT HOME, OR GET ALONG WITH OTHER PEOPLE: SOMEWHAT DIFFICULT
GAD7 TOTAL SCORE: 3
6. BECOMING EASILY ANNOYED OR IRRITABLE: NOT AT ALL
1. FEELING NERVOUS, ANXIOUS, OR ON EDGE: SEVERAL DAYS

## 2025-03-19 NOTE — PROGRESS NOTES
"Subjective   Patient ID: Lien Lee is a 49 y.o. female who presents for annual physical/wellness visit.    she signed document informing that if problem issues also address at today's wellness visit that insurance may be appropriately billed so co-pay and deductible out of pocket expenses may occur.    Colorectal cancer screen: 7/26/2023  Mammogram: 1/2/2025  Tdap: 3/10/2023  HIV screen: 7/8/2024  Hepatitis C screen: 7/8/2024  Hepatitis B vaccine: never done    She has noticed increased forgetfulness and she is dropping things often. This has been going on for a little while now. She is also experiencing some difficulty opening lids occasionally. She has had numbness and tingling in her fingers, one hand does not seem to be more effected than the other. She has also had a few episodes of dizziness over the past few weeks. She can get some mild headaches but these are well controlled with OTC medications. She denies any visual disturbances. She finds that she is struggling to find words in conversations and sometimes she does forget things from a conversation she had recently.   Her hips have been a bit stiff, the left is worse than the right. She has been to see to a chiropractor which seems to be helping. She attributes it to being so sedentary for work. She was given stretches to do at home as well.   She visits with an eye doctor and dentist regularly. She denies any hearing concerns.        Review of Systems   HENT: Negative.     Respiratory: Negative.     Cardiovascular: Negative.    Gastrointestinal: Negative.    Genitourinary: Negative.    Musculoskeletal: Negative.    Neurological:  Positive for dizziness and numbness.   Psychiatric/Behavioral:  Positive for confusion (forgetfulness).        Objective   /85 (BP Location: Left arm, Patient Position: Sitting, BP Cuff Size: Adult)   Pulse 89   Temp 37.1 °C (98.7 °F) (Temporal)   Resp 14   Ht 1.691 m (5' 6.58\")   Wt 68.3 kg (150 lb 8 oz)   " SpO2 97%   BMI 23.87 kg/m²     Physical Exam  Constitutional:       Appearance: Normal appearance. She is normal weight.   HENT:      Head: Normocephalic.      Right Ear: Tympanic membrane normal.      Left Ear: Tympanic membrane normal.      Nose: Nose normal.      Mouth/Throat:      Pharynx: Oropharynx is clear.   Eyes:      Conjunctiva/sclera: Conjunctivae normal.   Cardiovascular:      Rate and Rhythm: Normal rate and regular rhythm.      Pulses: Normal pulses.      Heart sounds: Normal heart sounds.   Pulmonary:      Effort: Pulmonary effort is normal.      Breath sounds: Normal breath sounds.   Abdominal:      General: Abdomen is flat. Bowel sounds are normal.      Palpations: Abdomen is soft.      Tenderness: There is abdominal tenderness in the left upper quadrant.   Musculoskeletal:      Cervical back: Normal range of motion.   Neurological:      General: No focal deficit present.      Mental Status: She is alert.   Psychiatric:         Mood and Affect: Mood normal.         Behavior: Behavior normal.         Thought Content: Thought content normal.         Judgment: Judgment normal.       Assessment/Plan   Problem List Items Addressed This Visit       Menopausal symptoms     Her memory symptoms, painful intercourse, weight gain may all be in part from menopause  She can discuss with her GYN re HRT          Anxiety     Stable w/ escitalopram 10 mg daily.  LYNETTE-7 Total Score: 3 (3/19/2025 10:42 AM)           Wellness examination    Diverticulosis     Advised increase dietary fiber and supplement with psyllium husk fiber          Vascular calcification - Primary     Family history of elevated cholesterol.  If ratio >3.4 or LDL >100 will advise statin for primary vascular disease prevention    Cardiac scoring CT 6/28/24:  LM 0,  LAD 30.12,  LCx 0,  RCA 0.69,      Total 30.81         Agatston CAC score 100-199     CAC 6/28/2024  LM 0,  LAD 30.12,  LCx 0,  RCA 0.69,      Total 30.81        10 year CV risk  "calculates 3.4%  (Without CAC risk estimate 1.8%)    Statin advised when risk >=7.5%         Memory loss    Relevant Orders    TSH with reflex to Free T4 if abnormal (Completed)    Vitamin B12 (Completed)    Drops things    Relevant Orders    TSH with reflex to Free T4 if abnormal (Completed)    Vitamin B12 (Completed)     Other Visit Diagnoses       Need for shingles vaccine        Vitamin D deficiency        Relevant Orders    Vitamin D 25-Hydroxy,Total (for eval of Vitamin D levels) (Completed)          Follow up: Scheduled 9/11/2025    Tips for your general wellness...;  Remember importance of daily aerobic exercise for 30minutes to help with both your physical and mental/emotional health.  ;  Take time twice a day to relax with focused breathing and relaxation.  A good source to learn \"mindfulness\" relaxation is a book \"Mindfulness for Beginners\" by Jose Clark.  ;    Strive for healthy eating with plenty of water, fruits, vegetables, and choosing as much plant based diet as able  If do consume non plant protein, choose fish high in omega (like salmon or cod), skinless poultry, and rarely anything from a cow  Avoid processed foods.  ;  Shop the perimeter of the grocery store  - not the inner aisles where all the boxed, bagged, and canned process non natural foods are   Avoid sugar - including fruit juices with added sugar and avoid artificial sweeteners (sucralose, aspartame).; if want to use sweetener, use stevia (natural plant based non caloric sweetener)  Recommended guided nutrition plans include Mediterranean Diet - online resources available     Get plenty of sleep nightly - 7 hours minimum;    Exercise 150min per week;    Do not use tobacco    Abstain or limit alcohol to 1-2 drinks per 24 hours    See your dentist at least yearly    Have an eye check at least every 5 years    Follow up 1 year for annual wellness checkup;    Scribe Attestation  By signing my name below, I, Nuzhat Tompkins , Gita "   attest that this documentation has been prepared under the direction and in the presence of Caleb Hudson MD.

## 2025-03-19 NOTE — ASSESSMENT & PLAN NOTE
CAC 6/28/2024  LM 0,  LAD 30.12,  LCx 0,  RCA 0.69,      Total 30.81        10 year CV risk calculates 3.4%  (Without CAC risk estimate 1.8%)    Statin advised when risk >=7.5%

## 2025-03-19 NOTE — ASSESSMENT & PLAN NOTE
Family history of elevated cholesterol.  If ratio >3.4 or LDL >100 will advise statin for primary vascular disease prevention    Cardiac scoring CT 6/28/24:  LM 0,  LAD 30.12,  LCx 0,  RCA 0.69,      Total 30.81

## 2025-03-20 DIAGNOSIS — R27.9 DROPS THINGS: Primary | ICD-10-CM

## 2025-03-20 DIAGNOSIS — R41.3 MEMORY LOSS: ICD-10-CM

## 2025-03-21 LAB
25(OH)D3+25(OH)D2 SERPL-MCNC: 45 NG/ML (ref 30–100)
BUN SERPL-MCNC: 14 MG/DL (ref 7–25)
BUN/CREAT SERPL: NORMAL (CALC) (ref 6–22)
CREAT SERPL-MCNC: 0.88 MG/DL (ref 0.5–1.03)
EGFRCR SERPLBLD CKD-EPI 2021: 80 ML/MIN/1.73M2
TSH SERPL-ACNC: 1.79 MIU/L
VIT B12 SERPL-MCNC: 352 PG/ML (ref 200–1100)

## 2025-03-24 ENCOUNTER — HOSPITAL ENCOUNTER (OUTPATIENT)
Dept: RADIOLOGY | Facility: CLINIC | Age: 50
Discharge: HOME | End: 2025-03-24
Payer: COMMERCIAL

## 2025-03-24 DIAGNOSIS — R41.3 MEMORY LOSS: ICD-10-CM

## 2025-03-24 DIAGNOSIS — R27.9 DROPS THINGS: ICD-10-CM

## 2025-03-24 PROCEDURE — 70553 MRI BRAIN STEM W/O & W/DYE: CPT

## 2025-03-24 PROCEDURE — 2550000001 HC RX 255 CONTRASTS: Performed by: FAMILY MEDICINE

## 2025-03-24 PROCEDURE — A9575 INJ GADOTERATE MEGLUMI 0.1ML: HCPCS | Performed by: FAMILY MEDICINE

## 2025-03-24 RX ORDER — GADOTERATE MEGLUMINE 376.9 MG/ML
13 INJECTION INTRAVENOUS
Status: COMPLETED | OUTPATIENT
Start: 2025-03-24 | End: 2025-03-24

## 2025-03-24 RX ADMIN — GADOTERATE MEGLUMINE 13 ML: 376.9 INJECTION INTRAVENOUS at 15:33

## 2025-03-25 NOTE — ASSESSMENT & PLAN NOTE
Her memory symptoms, painful intercourse, weight gain may all be in part from menopause  She can discuss with her GYN re HRT

## 2025-03-28 ENCOUNTER — APPOINTMENT (OUTPATIENT)
Dept: RADIOLOGY | Facility: CLINIC | Age: 50
End: 2025-03-28
Payer: COMMERCIAL

## 2025-03-31 ENCOUNTER — TELEPHONE (OUTPATIENT)
Dept: PRIMARY CARE | Facility: CLINIC | Age: 50
End: 2025-03-31
Payer: COMMERCIAL

## 2025-03-31 ENCOUNTER — APPOINTMENT (OUTPATIENT)
Dept: RADIOLOGY | Facility: CLINIC | Age: 50
End: 2025-03-31
Payer: COMMERCIAL

## 2025-03-31 NOTE — TELEPHONE ENCOUNTER
We do not typically prescribe preventive UTI antibiotics. Would recommend normal precautions to reduce risk of UTI including:   - avoid tub baths  - be sure to wipe the urethra from front to back after urinating  - urinating after sexual intercourse may also help decrease your risk of having another urinary tract infection  - empty bladder at least every 4 hours    Loretta Ramsey DO, MSEd  [facility]  Office: [phone]  3/31/2025 5:37 PM

## 2025-03-31 NOTE — TELEPHONE ENCOUNTER
Pt called in stating she's traveling out of country to [place] on Saturday and wants to know if a medication for UTI can be sent over as a preventative care ?       Sent to on call

## 2025-07-07 DIAGNOSIS — R03.0 ELEVATED BP WITHOUT DIAGNOSIS OF HYPERTENSION: ICD-10-CM

## 2025-07-07 RX ORDER — ESCITALOPRAM OXALATE 10 MG/1
10 TABLET ORAL DAILY
Qty: 90 TABLET | Refills: 1 | Status: SHIPPED | OUTPATIENT
Start: 2025-07-07

## 2025-07-22 DIAGNOSIS — N95.2 ATROPHIC VAGINITIS: Primary | ICD-10-CM

## 2025-07-22 RX ORDER — ESTRADIOL 4 UG/1
4 INSERT VAGINAL 2 TIMES WEEKLY
Qty: 24 EACH | Refills: 1 | Status: SHIPPED | OUTPATIENT
Start: 2025-07-24

## 2025-07-31 ENCOUNTER — TELEPHONE (OUTPATIENT)
Dept: PRIMARY CARE | Facility: CLINIC | Age: 50
End: 2025-07-31
Payer: COMMERCIAL

## 2025-07-31 DIAGNOSIS — N39.0 URINARY TRACT INFECTION WITHOUT HEMATURIA, SITE UNSPECIFIED: ICD-10-CM

## 2025-07-31 DIAGNOSIS — R30.0 DYSURIA: Primary | ICD-10-CM

## 2025-07-31 DIAGNOSIS — R10.2 PELVIC PRESSURE IN FEMALE: ICD-10-CM

## 2025-07-31 NOTE — TELEPHONE ENCOUNTER
Patient called concerned believing she has a UTI. Patient request labs. Patient alerted me of having a past history with UTI.     Please call patient back ~ [phone]

## 2025-08-01 ENCOUNTER — TELEPHONE (OUTPATIENT)
Dept: PRIMARY CARE | Facility: CLINIC | Age: 50
End: 2025-08-01
Payer: COMMERCIAL

## 2025-08-01 NOTE — TELEPHONE ENCOUNTER
Patient called and stated that the blood work she had done that the lab told her it needs to be changed from clinic to lab

## 2025-08-02 RX ORDER — CIPROFLOXACIN 250 MG/1
250 TABLET, FILM COATED ORAL 2 TIMES DAILY
Qty: 6 TABLET | Refills: 0 | Status: SHIPPED | OUTPATIENT
Start: 2025-08-02 | End: 2025-08-05

## 2025-08-03 LAB
APPEARANCE UR: CLEAR
BACTERIA #/AREA URNS HPF: ABNORMAL /HPF
BACTERIA UR CULT: ABNORMAL
BACTERIA UR CULT: ABNORMAL
BILIRUB UR QL STRIP: NEGATIVE
COLOR UR: YELLOW
GLUCOSE UR QL STRIP: NEGATIVE
HGB UR QL STRIP: NEGATIVE
HYALINE CASTS #/AREA URNS LPF: ABNORMAL /LPF
KETONES UR QL STRIP: NEGATIVE
LEUKOCYTE ESTERASE UR QL STRIP: ABNORMAL
NITRITE UR QL STRIP: NEGATIVE
PH UR STRIP: 6.5 [PH] (ref 5–8)
PROT UR QL STRIP: NEGATIVE
RBC #/AREA URNS HPF: ABNORMAL /HPF
SERVICE CMNT-IMP: ABNORMAL
SP GR UR STRIP: 1 (ref 1–1.03)
SQUAMOUS #/AREA URNS HPF: ABNORMAL /HPF
WBC #/AREA URNS HPF: ABNORMAL /HPF

## 2025-08-05 ENCOUNTER — TELEPHONE (OUTPATIENT)
Dept: PRIMARY CARE | Facility: CLINIC | Age: 50
End: 2025-08-05
Payer: COMMERCIAL

## 2025-08-05 NOTE — TELEPHONE ENCOUNTER
Pt called in stating she took all her cipro 250 mg tablet and does not feel any better currently and wants to know what is the next step and what should she do now

## 2025-08-05 NOTE — TELEPHONE ENCOUNTER
Urine culture was negative so no infection identified  Need more details of symptoms so can advise - get more info  May need to have her seen urogyn if having bladder issues without infection

## 2025-08-15 ENCOUNTER — OFFICE VISIT (OUTPATIENT)
Dept: OBSTETRICS AND GYNECOLOGY | Facility: CLINIC | Age: 50
End: 2025-08-15
Payer: COMMERCIAL

## 2025-08-15 VITALS
BODY MASS INDEX: 23.7 KG/M2 | SYSTOLIC BLOOD PRESSURE: 124 MMHG | HEIGHT: 67 IN | DIASTOLIC BLOOD PRESSURE: 82 MMHG | HEART RATE: 75 BPM | WEIGHT: 151 LBS

## 2025-08-15 DIAGNOSIS — R20.0 NUMBNESS AND TINGLING OF LEFT LEG: ICD-10-CM

## 2025-08-15 DIAGNOSIS — R10.2 PELVIC PAIN: Primary | ICD-10-CM

## 2025-08-15 DIAGNOSIS — R20.2 NUMBNESS AND TINGLING OF LEFT LEG: ICD-10-CM

## 2025-08-15 DIAGNOSIS — R39.9 UTI SYMPTOMS: ICD-10-CM

## 2025-08-15 LAB
POC APPEARANCE, URINE: CLEAR
POC BILIRUBIN, URINE: NEGATIVE
POC BLOOD, URINE: ABNORMAL
POC COLOR, URINE: YELLOW
POC GLUCOSE, URINE: NEGATIVE MG/DL
POC KETONES, URINE: NEGATIVE MG/DL
POC LEUKOCYTES, URINE: NEGATIVE
POC NITRITE,URINE: NEGATIVE
POC PH, URINE: 6 PH
POC PROTEIN, URINE: NEGATIVE MG/DL
POC SPECIFIC GRAVITY, URINE: 1.01
POC UROBILINOGEN, URINE: 0.2 EU/DL

## 2025-08-15 PROCEDURE — 99214 OFFICE O/P EST MOD 30 MIN: CPT | Mod: 25 | Performed by: OBSTETRICS & GYNECOLOGY

## 2025-08-15 PROCEDURE — 81003 URINALYSIS AUTO W/O SCOPE: CPT | Mod: QW | Performed by: OBSTETRICS & GYNECOLOGY

## 2025-08-15 PROCEDURE — 51798 US URINE CAPACITY MEASURE: CPT | Performed by: OBSTETRICS & GYNECOLOGY

## 2025-08-15 PROCEDURE — 99204 OFFICE O/P NEW MOD 45 MIN: CPT | Performed by: OBSTETRICS & GYNECOLOGY

## 2025-08-15 PROCEDURE — 3008F BODY MASS INDEX DOCD: CPT | Performed by: OBSTETRICS & GYNECOLOGY

## 2025-08-15 ASSESSMENT — ENCOUNTER SYMPTOMS
OCCASIONAL FEELINGS OF UNSTEADINESS: 0
LOSS OF SENSATION IN FEET: 0
DEPRESSION: 0

## 2025-08-15 ASSESSMENT — PAIN SCALES - GENERAL: PAINLEVEL_OUTOF10: 0-NO PAIN

## 2025-08-16 ASSESSMENT — ENCOUNTER SYMPTOMS
RESPIRATORY NEGATIVE: 1
NEUROLOGICAL NEGATIVE: 1
ABDOMINAL PAIN: 1
CARDIOVASCULAR NEGATIVE: 1
EYES NEGATIVE: 1
PSYCHIATRIC NEGATIVE: 1
CONSTITUTIONAL NEGATIVE: 1
MUSCULOSKELETAL NEGATIVE: 1
ENDOCRINE NEGATIVE: 1

## 2025-08-18 ENCOUNTER — TELEPHONE (OUTPATIENT)
Dept: OBSTETRICS AND GYNECOLOGY | Facility: CLINIC | Age: 50
End: 2025-08-18
Payer: COMMERCIAL

## 2025-08-22 ENCOUNTER — HOSPITAL ENCOUNTER (OUTPATIENT)
Dept: RADIOLOGY | Facility: CLINIC | Age: 50
Discharge: HOME | End: 2025-08-22
Payer: COMMERCIAL

## 2025-08-22 DIAGNOSIS — R39.9 UTI SYMPTOMS: ICD-10-CM

## 2025-08-22 DIAGNOSIS — R10.2 PELVIC PAIN: ICD-10-CM

## 2025-08-22 PROCEDURE — 76830 TRANSVAGINAL US NON-OB: CPT

## 2025-08-29 DIAGNOSIS — R39.9 UTI SYMPTOMS: ICD-10-CM

## 2025-09-11 ENCOUNTER — APPOINTMENT (OUTPATIENT)
Dept: PRIMARY CARE | Facility: CLINIC | Age: 50
End: 2025-09-11
Payer: COMMERCIAL

## 2025-10-14 ENCOUNTER — APPOINTMENT (OUTPATIENT)
Dept: OBSTETRICS AND GYNECOLOGY | Facility: CLINIC | Age: 50
End: 2025-10-14
Payer: COMMERCIAL

## 2025-10-21 ENCOUNTER — APPOINTMENT (OUTPATIENT)
Dept: OBSTETRICS AND GYNECOLOGY | Facility: CLINIC | Age: 50
End: 2025-10-21
Payer: COMMERCIAL

## 2025-10-29 ENCOUNTER — APPOINTMENT (OUTPATIENT)
Dept: DERMATOLOGY | Facility: CLINIC | Age: 50
End: 2025-10-29
Payer: COMMERCIAL